# Patient Record
Sex: FEMALE | Race: WHITE | NOT HISPANIC OR LATINO | Employment: UNEMPLOYED | URBAN - NONMETROPOLITAN AREA
[De-identification: names, ages, dates, MRNs, and addresses within clinical notes are randomized per-mention and may not be internally consistent; named-entity substitution may affect disease eponyms.]

---

## 2022-12-06 ENCOUNTER — OFFICE VISIT (OUTPATIENT)
Dept: INTERNAL MEDICINE | Facility: CLINIC | Age: 47
End: 2022-12-06

## 2022-12-06 VITALS
OXYGEN SATURATION: 98 % | TEMPERATURE: 97.8 F | HEART RATE: 73 BPM | DIASTOLIC BLOOD PRESSURE: 70 MMHG | HEIGHT: 61 IN | WEIGHT: 137 LBS | SYSTOLIC BLOOD PRESSURE: 120 MMHG | BODY MASS INDEX: 25.86 KG/M2

## 2022-12-06 DIAGNOSIS — Z13.228 SCREENING FOR ENDOCRINE, METABOLIC AND IMMUNITY DISORDER: ICD-10-CM

## 2022-12-06 DIAGNOSIS — C18.1 CANCER OF APPENDIX: ICD-10-CM

## 2022-12-06 DIAGNOSIS — Z13.0 SCREENING FOR ENDOCRINE, METABOLIC AND IMMUNITY DISORDER: ICD-10-CM

## 2022-12-06 DIAGNOSIS — Z23 NEED FOR TDAP VACCINATION: ICD-10-CM

## 2022-12-06 DIAGNOSIS — Z12.31 ENCOUNTER FOR SCREENING MAMMOGRAM FOR MALIGNANT NEOPLASM OF BREAST: ICD-10-CM

## 2022-12-06 DIAGNOSIS — Z12.11 SCREEN FOR COLON CANCER: ICD-10-CM

## 2022-12-06 DIAGNOSIS — Z13.220 SCREENING FOR LIPID DISORDERS: ICD-10-CM

## 2022-12-06 DIAGNOSIS — Z13.29 SCREENING FOR ENDOCRINE, METABOLIC AND IMMUNITY DISORDER: ICD-10-CM

## 2022-12-06 DIAGNOSIS — Z13.0 SCREENING FOR DISORDER OF BLOOD AND BLOOD-FORMING ORGANS: ICD-10-CM

## 2022-12-06 DIAGNOSIS — Z76.89 ENCOUNTER TO ESTABLISH CARE: Primary | ICD-10-CM

## 2022-12-06 PROCEDURE — 99203 OFFICE O/P NEW LOW 30 MIN: CPT | Performed by: NURSE PRACTITIONER

## 2022-12-06 PROCEDURE — 90471 IMMUNIZATION ADMIN: CPT | Performed by: NURSE PRACTITIONER

## 2022-12-06 PROCEDURE — 90715 TDAP VACCINE 7 YRS/> IM: CPT | Performed by: NURSE PRACTITIONER

## 2022-12-06 RX ORDER — ASPIRIN 81 MG/1
81 TABLET ORAL DAILY
COMMUNITY

## 2022-12-06 NOTE — PROGRESS NOTES
Date: 2022    Name: Patricia Castillo  : 1975    Chief Complaint:   Chief Complaint   Patient presents with   • Establish Care       HPI:  Patricia Castillo is a 47 y.o. female presents [facility]. She had been seeing a provider in [facility].       Appendicial cancer. Goes to [facility] at least twice a year for surveillance. Debulking x 3. Concerned she needs a colonoscopy.        History:  LMP: No LMP recorded. Patient has had a hysterectomy.  Menopause at 37 years  Sexual activity: not currently  : 2  Para: 2    Do you take any herbs or supplements that were not prescribed by a doctor? yes, probiotic, calcium, hair/skin/nail gummies, MVN    Health Habits:  Dental Exam. up to date  Eye Exam. not up to date - does not corrective lenses  Current exercise activities include: walks on treadmill  Current diet: healthy diet    History:    Past Medical History:   Diagnosis Date   • Allergic     Chloraseptic-hives   • Cancer (HCC)     Appendiceal Cancer   • Headache    • Heart murmur        Past Surgical History:   Procedure Laterality Date   • APPENDECTOMY  2013    Appendix removed cancer found   • CHOLECYSTECTOMY  10-1-2013    Removed during cancer debulking surgery   • COLON SURGERY  10-1-2014    Partial removal during cancer debulking surgery   • COLONOSCOPY  13   • HYSTERECTOMY  2013    Full hysterectomy   • SMALL INTESTINE SURGERY  10-1-2013    Partial removal during cancer debulking & HIPEC   • TONSILLECTOMY         Family History   Problem Relation Age of Onset   • Hyperlipidemia Mother         Passed from cardiac arrest   • Other Mother         Alzheimer’s Disease   • Other Maternal Grandmother         Alzheimer’s Disease   • Cancer Paternal Grandfather         Unsure   • Alcohol abuse Sister    • Anxiety disorder Sister    • Alcohol abuse Paternal Aunt          from alcohol abuse       Social History     Socioeconomic History   • Marital  "status:    Tobacco Use   • Smoking status: Former     Packs/day: 0.25     Years: 1.00     Pack years: 0.25     Types: Cigarettes     Quit date:      Years since quittin.0   • Smokeless tobacco: Never   • Tobacco comments:     Smoked for approx a year from age [age]   Vaping Use   • Vaping Use: Never used   Substance and Sexual Activity   • Alcohol use: Not Currently     Comment: Occasional drink   • Drug use: Never   • Sexual activity: Not Currently     Partners: Male     Birth control/protection: Hysterectomy       Allergies   Allergen Reactions   • Chloraseptic Sore Throat [Acetaminophen] Hives         Current Outpatient Medications:   •  aspirin 81 MG EC tablet, Take 81 mg by mouth Daily., Disp: , Rfl:   •  Probiotic Product (PROBIOTIC DAILY PO), Take  by mouth. UQORA - 3 part probiotic for bladder, Disp: , Rfl:     VS:  Vitals:    22 1103   BP: 120/70   Pulse: 73   Temp: 97.8 °F (36.6 °C)   TempSrc: Infrared   SpO2: 98%   Weight: 62.1 kg (137 lb)   Height: 154.9 cm (61\")   PainSc: 0-No pain     Body mass index is 25.89 kg/m².    PE:  Physical Exam  Constitutional:       Appearance: She is not ill-appearing.   HENT:      Head: Normocephalic.      Right Ear: External ear normal.      Left Ear: External ear normal.   Eyes:      Conjunctiva/sclera: Conjunctivae normal.      Pupils: Pupils are equal, round, and reactive to light.   Cardiovascular:      Rate and Rhythm: Normal rate and regular rhythm.      Pulses:           Radial pulses are 2+ on the right side and 2+ on the left side.        Dorsalis pedis pulses are 2+ on the right side and 2+ on the left side.      Heart sounds: Normal heart sounds.   Pulmonary:      Effort: Pulmonary effort is normal.      Breath sounds: Normal breath sounds.   Musculoskeletal:      Cervical back: Normal range of motion and neck supple.      Right lower leg: No edema.      Left lower leg: No edema.   Skin:     General: Skin is warm.      Capillary Refill: " Capillary refill takes less than 2 seconds.   Neurological:      Mental Status: She is alert and oriented to person, place, and time.      Coordination: Coordination normal.      Gait: Gait normal.   Psychiatric:         Mood and Affect: Mood normal.         Behavior: Behavior normal.         Thought Content: Thought content normal.         Assessment/Plan:         Diagnoses and all orders for this visit:    1. Encounter to establish care (Primary)    2. Cancer of appendix (HCC)  -     Ambulatory Referral For Screening Colonoscopy    3. Screen for colon cancer  -     Ambulatory Referral For Screening Colonoscopy    4. Encounter for screening mammogram for malignant neoplasm of breast  -     Mammo screening digital tomosynthesis bilateral w CAD; Future    5. Screening for endocrine, metabolic and immunity disorder  -     Comprehensive Metabolic Panel    6. Screening for disorder of blood and blood-forming organs  -     CBC & Differential  -     Vitamin B12    7. Screening for lipid disorders  -     Lipid Panel    8. Need for Tdap vaccination  -     Tdap Vaccine Greater Than or Equal To 6yo IM          Return in about 1 year (around 12/6/2023) for Annual.

## 2022-12-07 LAB
ALBUMIN SERPL-MCNC: 4.3 G/DL (ref 3.5–5.2)
ALBUMIN/GLOB SERPL: 1.5 G/DL
ALP SERPL-CCNC: 75 U/L (ref 39–117)
ALT SERPL-CCNC: 19 U/L (ref 1–33)
AST SERPL-CCNC: 18 U/L (ref 1–32)
BASOPHILS # BLD AUTO: 0.04 10*3/MM3 (ref 0–0.2)
BASOPHILS NFR BLD AUTO: 0.6 % (ref 0–1.5)
BILIRUB SERPL-MCNC: <0.2 MG/DL (ref 0–1.2)
BUN SERPL-MCNC: 10 MG/DL (ref 6–20)
BUN/CREAT SERPL: 13 (ref 7–25)
CALCIUM SERPL-MCNC: 9.5 MG/DL (ref 8.6–10.5)
CHLORIDE SERPL-SCNC: 101 MMOL/L (ref 98–107)
CHOLEST SERPL-MCNC: 213 MG/DL (ref 0–200)
CO2 SERPL-SCNC: 30.7 MMOL/L (ref 22–29)
CREAT SERPL-MCNC: 0.77 MG/DL (ref 0.57–1)
EGFRCR SERPLBLD CKD-EPI 2021: 95.3 ML/MIN/1.73
EOSINOPHIL # BLD AUTO: 0.06 10*3/MM3 (ref 0–0.4)
EOSINOPHIL NFR BLD AUTO: 0.9 % (ref 0.3–6.2)
ERYTHROCYTE [DISTWIDTH] IN BLOOD BY AUTOMATED COUNT: 12.5 % (ref 12.3–15.4)
GLOBULIN SER CALC-MCNC: 2.8 GM/DL
GLUCOSE SERPL-MCNC: 89 MG/DL (ref 65–99)
HCT VFR BLD AUTO: 35.9 % (ref 34–46.6)
HDLC SERPL-MCNC: 51 MG/DL (ref 40–60)
HGB BLD-MCNC: 11.9 G/DL (ref 12–15.9)
IMM GRANULOCYTES # BLD AUTO: 0.01 10*3/MM3 (ref 0–0.05)
IMM GRANULOCYTES NFR BLD AUTO: 0.2 % (ref 0–0.5)
LDLC SERPL CALC-MCNC: 127 MG/DL (ref 0–100)
LYMPHOCYTES # BLD AUTO: 1.47 10*3/MM3 (ref 0.7–3.1)
LYMPHOCYTES NFR BLD AUTO: 22.7 % (ref 19.6–45.3)
MCH RBC QN AUTO: 28 PG (ref 26.6–33)
MCHC RBC AUTO-ENTMCNC: 33.1 G/DL (ref 31.5–35.7)
MCV RBC AUTO: 84.5 FL (ref 79–97)
MONOCYTES # BLD AUTO: 0.69 10*3/MM3 (ref 0.1–0.9)
MONOCYTES NFR BLD AUTO: 10.6 % (ref 5–12)
NEUTROPHILS # BLD AUTO: 4.21 10*3/MM3 (ref 1.7–7)
NEUTROPHILS NFR BLD AUTO: 65 % (ref 42.7–76)
NRBC BLD AUTO-RTO: 0 /100 WBC (ref 0–0.2)
PLATELET # BLD AUTO: 455 10*3/MM3 (ref 140–450)
POTASSIUM SERPL-SCNC: 4.5 MMOL/L (ref 3.5–5.2)
PROT SERPL-MCNC: 7.1 G/DL (ref 6–8.5)
RBC # BLD AUTO: 4.25 10*6/MM3 (ref 3.77–5.28)
SODIUM SERPL-SCNC: 141 MMOL/L (ref 136–145)
TRIGL SERPL-MCNC: 201 MG/DL (ref 0–150)
VIT B12 SERPL-MCNC: 456 PG/ML (ref 211–946)
VLDLC SERPL CALC-MCNC: 35 MG/DL (ref 5–40)
WBC # BLD AUTO: 6.48 10*3/MM3 (ref 3.4–10.8)

## 2023-02-02 ENCOUNTER — HOSPITAL ENCOUNTER (OUTPATIENT)
Dept: MAMMOGRAPHY | Facility: HOSPITAL | Age: 48
Discharge: HOME | End: 2023-02-02
Admitting: NURSE PRACTITIONER
Payer: COMMERCIAL

## 2023-02-02 DIAGNOSIS — Z12.31 ENCOUNTER FOR SCREENING MAMMOGRAM FOR MALIGNANT NEOPLASM OF BREAST: ICD-10-CM

## 2023-02-02 PROCEDURE — 77067 SCR MAMMO BI INCL CAD: CPT

## 2023-02-02 PROCEDURE — 77063 BREAST TOMOSYNTHESIS BI: CPT

## 2023-07-20 ENCOUNTER — HOSPITAL ENCOUNTER (INPATIENT)
Facility: HOSPITAL | Age: 48
LOS: 3 days | Discharge: HOME | DRG: 603 | End: 2023-07-24
Attending: INTERNAL MEDICINE | Admitting: INTERNAL MEDICINE
Payer: COMMERCIAL

## 2023-07-20 ENCOUNTER — HOSPITAL ENCOUNTER (EMERGENCY)
Facility: HOSPITAL | Age: 48
Discharge: ANOTHER HOSPITAL | End: 2023-07-20
Attending: EMERGENCY MEDICINE | Admitting: EMERGENCY MEDICINE
Payer: COMMERCIAL

## 2023-07-20 ENCOUNTER — APPOINTMENT (OUTPATIENT)
Dept: CT IMAGING | Facility: HOSPITAL | Age: 48
End: 2023-07-20
Payer: COMMERCIAL

## 2023-07-20 VITALS
OXYGEN SATURATION: 96 % | HEIGHT: 62 IN | HEART RATE: 92 BPM | SYSTOLIC BLOOD PRESSURE: 107 MMHG | RESPIRATION RATE: 18 BRPM | DIASTOLIC BLOOD PRESSURE: 65 MMHG | TEMPERATURE: 99.7 F | WEIGHT: 118 LBS | BODY MASS INDEX: 21.71 KG/M2

## 2023-07-20 DIAGNOSIS — L02.211 ABDOMINAL WALL ABSCESS: Primary | ICD-10-CM

## 2023-07-20 PROBLEM — L02.91 ABSCESS: Status: ACTIVE | Noted: 2023-07-20

## 2023-07-20 LAB
ALBUMIN SERPL-MCNC: 3.7 G/DL (ref 3.5–5.2)
ALBUMIN/GLOB SERPL: 1 G/DL
ALP SERPL-CCNC: 159 U/L (ref 39–117)
ALT SERPL W P-5'-P-CCNC: 29 U/L (ref 1–33)
ANION GAP SERPL CALCULATED.3IONS-SCNC: 16.2 MMOL/L (ref 5–15)
AST SERPL-CCNC: 34 U/L (ref 1–32)
BASOPHILS # BLD AUTO: 0.02 10*3/MM3 (ref 0–0.2)
BASOPHILS NFR BLD AUTO: 0.2 % (ref 0–1.5)
BILIRUB SERPL-MCNC: 0.2 MG/DL (ref 0–1.2)
BUN SERPL-MCNC: 14 MG/DL (ref 6–20)
BUN/CREAT SERPL: 20 (ref 7–25)
CALCIUM SPEC-SCNC: 9.3 MG/DL (ref 8.6–10.5)
CHLORIDE SERPL-SCNC: 101 MMOL/L (ref 98–107)
CO2 SERPL-SCNC: 21.8 MMOL/L (ref 22–29)
CREAT SERPL-MCNC: 0.7 MG/DL (ref 0.57–1)
D-LACTATE SERPL-SCNC: 0.9 MMOL/L (ref 0.5–2)
DEPRECATED RDW RBC AUTO: 44.4 FL (ref 37–54)
EGFRCR SERPLBLD CKD-EPI 2021: 106.2 ML/MIN/1.73
EOSINOPHIL # BLD AUTO: 0.04 10*3/MM3 (ref 0–0.4)
EOSINOPHIL NFR BLD AUTO: 0.4 % (ref 0.3–6.2)
ERYTHROCYTE [DISTWIDTH] IN BLOOD BY AUTOMATED COUNT: 14.8 % (ref 12.3–15.4)
GLOBULIN UR ELPH-MCNC: 3.7 GM/DL
GLUCOSE SERPL-MCNC: 87 MG/DL (ref 65–99)
HCT VFR BLD AUTO: 32.3 % (ref 34–46.6)
HGB BLD-MCNC: 10.3 G/DL (ref 12–15.9)
IMM GRANULOCYTES # BLD AUTO: 0.04 10*3/MM3 (ref 0–0.05)
IMM GRANULOCYTES NFR BLD AUTO: 0.4 % (ref 0–0.5)
LYMPHOCYTES # BLD AUTO: 1.25 10*3/MM3 (ref 0.7–3.1)
LYMPHOCYTES NFR BLD AUTO: 11.1 % (ref 19.6–45.3)
MCH RBC QN AUTO: 26.5 PG (ref 26.6–33)
MCHC RBC AUTO-ENTMCNC: 31.9 G/DL (ref 31.5–35.7)
MCV RBC AUTO: 83 FL (ref 79–97)
MONOCYTES # BLD AUTO: 1.06 10*3/MM3 (ref 0.1–0.9)
MONOCYTES NFR BLD AUTO: 9.4 % (ref 5–12)
NEUTROPHILS NFR BLD AUTO: 78.5 % (ref 42.7–76)
NEUTROPHILS NFR BLD AUTO: 8.88 10*3/MM3 (ref 1.7–7)
NRBC BLD AUTO-RTO: 0 /100 WBC (ref 0–0.2)
PLATELET # BLD AUTO: 605 10*3/MM3 (ref 140–450)
PMV BLD AUTO: 8.9 FL (ref 6–12)
POTASSIUM SERPL-SCNC: 3.8 MMOL/L (ref 3.5–5.2)
PROCALCITONIN SERPL-MCNC: 0.16 NG/ML (ref 0–0.25)
PROT SERPL-MCNC: 7.4 G/DL (ref 6–8.5)
RBC # BLD AUTO: 3.89 10*6/MM3 (ref 3.77–5.28)
SODIUM SERPL-SCNC: 139 MMOL/L (ref 136–145)
WBC NRBC COR # BLD: 11.29 10*3/MM3 (ref 3.4–10.8)

## 2023-07-20 PROCEDURE — 84145 PROCALCITONIN (PCT): CPT | Performed by: PHYSICIAN ASSISTANT

## 2023-07-20 PROCEDURE — 25010000002 PIPERACILLIN SOD-TAZOBACTAM PER 1 G: Performed by: PHYSICIAN ASSISTANT

## 2023-07-20 PROCEDURE — G0378 HOSPITAL OBSERVATION PER HR: HCPCS

## 2023-07-20 PROCEDURE — 93005 ELECTROCARDIOGRAM TRACING: CPT | Performed by: NURSE PRACTITIONER

## 2023-07-20 PROCEDURE — 96365 THER/PROPH/DIAG IV INF INIT: CPT

## 2023-07-20 PROCEDURE — 25510000001 IOPAMIDOL 61 % SOLUTION: Performed by: EMERGENCY MEDICINE

## 2023-07-20 PROCEDURE — 36415 COLL VENOUS BLD VENIPUNCTURE: CPT

## 2023-07-20 PROCEDURE — 85025 COMPLETE CBC W/AUTO DIFF WBC: CPT | Performed by: PHYSICIAN ASSISTANT

## 2023-07-20 PROCEDURE — 80053 COMPREHEN METABOLIC PANEL: CPT | Performed by: PHYSICIAN ASSISTANT

## 2023-07-20 PROCEDURE — 74177 CT ABD & PELVIS W/CONTRAST: CPT

## 2023-07-20 PROCEDURE — 83605 ASSAY OF LACTIC ACID: CPT | Performed by: PHYSICIAN ASSISTANT

## 2023-07-20 PROCEDURE — 99283 EMERGENCY DEPT VISIT LOW MDM: CPT

## 2023-07-20 RX ORDER — SODIUM CHLORIDE 0.9 % (FLUSH) 0.9 %
10 SYRINGE (ML) INJECTION AS NEEDED
Status: DISCONTINUED | OUTPATIENT
Start: 2023-07-20 | End: 2023-07-20 | Stop reason: HOSPADM

## 2023-07-20 RX ADMIN — IOPAMIDOL 100 ML: 612 INJECTION, SOLUTION INTRAVENOUS at 16:34

## 2023-07-20 RX ADMIN — SODIUM CHLORIDE 1000 ML: 9 INJECTION, SOLUTION INTRAVENOUS at 19:30

## 2023-07-20 RX ADMIN — TAZOBACTAM SODIUM AND PIPERACILLIN SODIUM 3.38 G: 375; 3 INJECTION, SOLUTION INTRAVENOUS at 19:43

## 2023-07-20 NOTE — ED PROVIDER NOTES
Subjective  History of Present Illness:    Chief Complaint:   Chief Complaint   Patient presents with    Abdominal Pain      History of Present Illness: Patricia Castillo is a 47 y.o. female who presents to the emergency department complaining of abdominal redness pain, subjective fevers concern for abscess patient has a history of appendiceal cancer and follows with [facility] in [place].  Is supposed to see them in 1 month.  Has had numerous abdominal surgeries and has significant scar tissue.  States for the past week he said redness firmness and a blister that developed to her upper abdominal area.  Subjective fevers at home and chills.  Mild diarrhea.  No vomiting.  Saw PCP today and was given a prescription for Bactrim but told to come to the ED to rule out anything worse.  Surgery was January 2021  Onset: 1 week ago  Duration: Ongoing  Exacerbating / Alleviating factors: Worse with palpation of the area  Associated symptoms: None      Nurses Notes reviewed and agree, including vitals, allergies, social history and prior medical history.     Review of Systems   Constitutional:  Positive for chills and fever.   HENT: Negative.     Eyes: Negative.    Respiratory: Negative.     Cardiovascular: Negative.    Gastrointestinal: Negative.    Genitourinary: Negative.    Musculoskeletal: Negative.    Skin:         Redness, firmness and swelling to the upper abdomen   Neurological: Negative.    Psychiatric/Behavioral: Negative.       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    Cancer 8-2013    Appendiceal Cancer    Headache 1985    Heart murmur 1988       Allergies:    Chloraseptic sore throat [acetaminophen] and Phenol      Past Surgical History:   Procedure Laterality Date    APPENDECTOMY  8-5-2013    Appendix removed cancer found    CHOLECYSTECTOMY  10-1-2013    Removed during cancer debulking surgery    COLON SURGERY  10-1-2014    Partial removal during cancer debulking surgery     "COLONOSCOPY  13    HYSTERECTOMY  2013    Full hysterectomy    OOPHORECTOMY      SMALL INTESTINE SURGERY  10-1-2013    Partial removal during cancer debulking & HIPEC    TONSILLECTOMY           Social History     Socioeconomic History    Marital status:    Tobacco Use    Smoking status: Former     Packs/day: 0.25     Years: 1.00     Pack years: 0.25     Types: Cigarettes     Quit date:      Years since quittin.5    Smokeless tobacco: Never    Tobacco comments:     Smoked for approx a year from age [age]   Vaping Use    Vaping Use: Never used   Substance and Sexual Activity    Alcohol use: Not Currently     Comment: Occasional drink    Drug use: Never    Sexual activity: Not Currently     Partners: Male     Birth control/protection: Hysterectomy         Family History   Problem Relation Age of Onset    Hyperlipidemia Mother         Passed from cardiac arrest    Other Mother         Alzheimer’s Disease    Alcohol abuse Sister     Anxiety disorder Sister     Other Maternal Grandmother         Alzheimer’s Disease    Alcohol abuse Paternal Aunt          from alcohol abuse    Cancer Paternal Grandfather         Unsure    Breast cancer Neg Hx        Objective  Physical Exam:  BP 98/56   Pulse 86   Temp 98.2 °F (36.8 °C) (Oral)   Resp 18   Ht 156.2 cm (61.5\")   Wt 53.5 kg (118 lb)   SpO2 100%   BMI 21.93 kg/m²      Physical Exam  Vitals and nursing note reviewed.   Constitutional:       General: She is not in acute distress.     Appearance: She is well-developed and normal weight. She is not ill-appearing, toxic-appearing or diaphoretic.   HENT:      Head: Normocephalic and atraumatic.   Eyes:      Extraocular Movements: Extraocular movements intact.   Cardiovascular:      Rate and Rhythm: Normal rate.   Pulmonary:      Effort: Pulmonary effort is normal.   Abdominal:      General: Abdomen is flat.      Palpations: Abdomen is soft.      Comments: There is a 10 cm wide by 6 cm in height " area of induration and erythema to the upper abdomen with a small blister overlying.  No drainage.  The rest of the abdomen is soft and nontender   Skin:     General: Skin is warm and dry.      Comments: Normal other than abdominal wall as mentioned above   Neurological:      General: No focal deficit present.      Mental Status: She is alert.   Psychiatric:         Mood and Affect: Mood normal.         Behavior: Behavior normal.         Procedures    ED Course:    ED Course as of 07/20/23 1922   Thu Jul 20, 2023   1556 WBC(!): 11.29 [TM]   1625 Lactate: 0.9 [TM]   1627 Procalcitonin: 0.16 [TM]      ED Course User Index  [TM] Pj Cerda PA-C       Lab Results (last 24 hours)       Procedure Component Value Units Date/Time    CBC & Differential [[id number]]  (Abnormal) Collected: 07/20/23 1542    Specimen: Blood Updated: 07/20/23 1554    Narrative:      The following orders were created for panel order CBC & Differential.  Procedure                               Abnormality         Status                     ---------                               -----------         ------                     CBC Auto Differential[[id number]]        Abnormal            Final result                 Please view results for these tests on the individual orders.    Comprehensive Metabolic Panel [[id number]]  (Abnormal) Collected: 07/20/23 1542    Specimen: Blood Updated: 07/20/23 1622     Glucose 87 mg/dL      BUN 14 mg/dL      Creatinine 0.70 mg/dL      Sodium 139 mmol/L      Potassium 3.8 mmol/L      Chloride 101 mmol/L      CO2 21.8 mmol/L      Calcium 9.3 mg/dL      Total Protein 7.4 g/dL      Albumin 3.7 g/dL      ALT (SGPT) 29 U/L      AST (SGOT) 34 U/L      Alkaline Phosphatase 159 U/L      Total Bilirubin 0.2 mg/dL      Globulin 3.7 gm/dL      A/G Ratio 1.0 g/dL      BUN/Creatinine Ratio 20.0     Anion Gap 16.2 mmol/L      eGFR 106.2 mL/min/1.73     Narrative:      GFR Normal >60  Chronic Kidney Disease <60  Kidney  "Failure <15      Procalcitonin [[id number]]  (Normal) Collected: 07/20/23 1542    Specimen: Blood Updated: 07/20/23 1626     Procalcitonin 0.16 ng/mL     Narrative:      As a Marker for Sepsis (Non-Neonates):    1. <0.5 ng/mL represents a low risk of severe sepsis and/or septic shock.  2. >2 ng/mL represents a high risk of severe sepsis and/or septic shock.    As a Marker for Lower Respiratory Tract Infections that require antibiotic therapy:    PCT on Admission    Antibiotic Therapy       6-12 Hrs later    >0.5                Strongly Recommended  >0.25 - <0.5        Recommended   0.1 - 0.25          Discouraged              Remeasure/reassess PCT  <0.1                Strongly Discouraged     Remeasure/reassess PCT    As 28 day mortality risk marker: \"Change in Procalcitonin Result\" (>80% or <=80%) if Day 0 (or Day 1) and Day 4 values are available. Refer to http://[email or web address]-pct-[email or web address]    Change in PCT <=80%  A decrease of PCT levels below or equal to 80% defines a positive change in PCT test result representing a higher risk for 28-day all-cause mortality of patients diagnosed with severe sepsis for septic shock.    Change in PCT >80%  A decrease of PCT levels of more than 80% defines a negative change in PCT result representing a lower risk for 28-day all-cause mortality of patients diagnosed with severe sepsis or septic shock.       Lactic Acid, Plasma [[id number]]  (Normal) Collected: 07/20/23 1542    Specimen: Blood Updated: 07/20/23 1614     Lactate 0.9 mmol/L     CBC Auto Differential [[id number]]  (Abnormal) Collected: 07/20/23 1542    Specimen: Blood Updated: 07/20/23 1554     WBC 11.29 10*3/mm3      RBC 3.89 10*6/mm3      Hemoglobin 10.3 g/dL      Hematocrit 32.3 %      MCV 83.0 fL      MCH 26.5 pg      MCHC 31.9 g/dL      RDW 14.8 %      RDW-SD 44.4 fl      MPV 8.9 fL      Platelets 605 10*3/mm3      Neutrophil % 78.5 %      Lymphocyte % 11.1 %      Monocyte % 9.4 %      Eosinophil % 0.4 %      " Basophil % 0.2 %      Immature Grans % 0.4 %      Neutrophils, Absolute 8.88 10*3/mm3      Lymphocytes, Absolute 1.25 10*3/mm3      Monocytes, Absolute 1.06 10*3/mm3      Eosinophils, Absolute 0.04 10*3/mm3      Basophils, Absolute 0.02 10*3/mm3      Immature Grans, Absolute 0.04 10*3/mm3      nRBC 0.0 /100 WBC              CT Abdomen Pelvis With Contrast    Result Date: 7/20/2023  FINAL REPORT TECHNIQUE: Routine axial images through the abdomen and pelvis were obtained following IV contrast administration. CLINICAL HISTORY: abdominal wall pain, induration and erythema, hx of appendiceal cancer FINDINGS: Abdomen: The gallbladder is not visualized.  The solid abdominal organs and ureters are unremarkable.  Multiloculated fluid collections are seen in the anterior peritoneum adjacent to the liver worrisome for pseudomyxoma peritonei.  Similar small collections are seen on the left side of the anterior abdomen.  Within the midline anterior bone wall is a 6.5 x 3.5 cm air and fluid collection consistent with abscess.  The distal colon is dilated with air and fluid.  There are some loops of dilated air and fluid-filled small bowel in the abdomen and pelvis. Pelvis:Probable loculated fluid collections are seen in the pelvis . The urinary bladder is not well visualized.  The uterus and ovaries are not visualized  There is no pelvic or abdominal ascites, adenopathy or acute osseous abnormality.     Impression: Abscess within the midline anterior abdominal wall.  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  Dilation within the colon and loops of small bowel of uncertain etiology, partial bowel obstruction cannot be excluded. Authenticated and Electronically Signed by Jose D Jay M.D. on 07/20/2023 07:03:55 PM        Medical Decision Making  Amount and/or Complexity of Data Reviewed  Labs: ordered. Decision-making details documented in ED Course.  Radiology:  ordered.    Risk  Prescription drug management.        Patricia Castillo is a 47 y.o. female who presents to the emergency department for evaluation of redness and swelling to abdomen    Differential diagnosis includes abdominal wall abscess, hernia among other etiologies.    CBC, CMP, lactic, Pro-Kenji, CT scan abdomen pelvis ordered for further evaluation of the patient's presentation.    Chart review if available included outside testing, previous visits, prior labs, prior imaging, available notes from prior evaluations or visits with specialists, medication list, allergies, past medical history, past surgical history when applicable.    Patient was treated with IV fluids and Zosyn    1730 spoke with Dr. Shine, general surgery, he reviewed images, wished for  to review images and give input    1800  Spoke with  transfer physician, they are on divert and unable to accept the patient in transfer at this time    1808  Made Dr. Shine aware of inability to accept at , awaiting his response.    1817  Dr. Shine wishes for me to call hospitalist here, plan to admit for percutaneous drain tomorrow    1818  Spoke with Dr. Valencia, he wishes to speak with Dr. Shine prior to excepting.  Awaiting his response.    1830  Dr. Shine states would not be performing open procedure on patient, he wished for me to speak with IR although I do not have anyone on-call to speak with at this time. Dr. Shine also expressed concern that this could possibly be a fistula necessitating higher level of care.  Discussed with Dr. Catalan, spoke with hospitalist at [facility] who graciously excepted the patient in transfer    Plan for disposition is transfer.  Patient/family comfortable with and understanding of the plan.      Final diagnoses:   Abdominal wall abscess          Pj Cerda PA-C  07/20/23 1922

## 2023-07-21 ENCOUNTER — APPOINTMENT (OUTPATIENT)
Dept: ULTRASOUND IMAGING | Facility: HOSPITAL | Age: 48
DRG: 603 | End: 2023-07-21
Payer: COMMERCIAL

## 2023-07-21 PROBLEM — L02.91 ABSCESS: Status: ACTIVE | Noted: 2023-07-21

## 2023-07-21 PROBLEM — L02.211 ABDOMINAL WALL ABSCESS: Status: ACTIVE | Noted: 2023-07-21

## 2023-07-21 PROBLEM — D64.9 NORMOCYTIC ANEMIA: Status: ACTIVE | Noted: 2023-07-21

## 2023-07-21 PROBLEM — Z85.9 HISTORY OF CANCER: Status: ACTIVE | Noted: 2023-07-21

## 2023-07-21 LAB
ABO GROUP BLD: NORMAL
ABO GROUP BLD: NORMAL
ALBUMIN SERPL-MCNC: 3 G/DL (ref 3.5–5.2)
ALBUMIN SERPL-MCNC: 3.1 G/DL (ref 3.5–5.2)
ALBUMIN/GLOB SERPL: 0.9 G/DL
ALBUMIN/GLOB SERPL: 1.1 G/DL
ALP SERPL-CCNC: 155 U/L (ref 39–117)
ALP SERPL-CCNC: 161 U/L (ref 39–117)
ALT SERPL W P-5'-P-CCNC: 26 U/L (ref 1–33)
ALT SERPL W P-5'-P-CCNC: 29 U/L (ref 1–33)
ANION GAP SERPL CALCULATED.3IONS-SCNC: 13 MMOL/L (ref 5–15)
ANION GAP SERPL CALCULATED.3IONS-SCNC: 14 MMOL/L (ref 5–15)
AST SERPL-CCNC: 26 U/L (ref 1–32)
AST SERPL-CCNC: 29 U/L (ref 1–32)
BASOPHILS # BLD AUTO: 0.02 10*3/MM3 (ref 0–0.2)
BASOPHILS # BLD AUTO: 0.02 10*3/MM3 (ref 0–0.2)
BASOPHILS NFR BLD AUTO: 0.2 % (ref 0–1.5)
BASOPHILS NFR BLD AUTO: 0.2 % (ref 0–1.5)
BILIRUB SERPL-MCNC: <0.2 MG/DL (ref 0–1.2)
BILIRUB SERPL-MCNC: <0.2 MG/DL (ref 0–1.2)
BLD GP AB SCN SERPL QL: NEGATIVE
BUN SERPL-MCNC: 13 MG/DL (ref 6–20)
BUN SERPL-MCNC: 13 MG/DL (ref 6–20)
BUN/CREAT SERPL: 18.6 (ref 7–25)
BUN/CREAT SERPL: 19.4 (ref 7–25)
CALCIUM SPEC-SCNC: 8.4 MG/DL (ref 8.6–10.5)
CALCIUM SPEC-SCNC: 8.5 MG/DL (ref 8.6–10.5)
CHLORIDE SERPL-SCNC: 105 MMOL/L (ref 98–107)
CHLORIDE SERPL-SCNC: 106 MMOL/L (ref 98–107)
CK SERPL-CCNC: 37 U/L (ref 20–180)
CO2 SERPL-SCNC: 20 MMOL/L (ref 22–29)
CO2 SERPL-SCNC: 21 MMOL/L (ref 22–29)
CREAT SERPL-MCNC: 0.67 MG/DL (ref 0.57–1)
CREAT SERPL-MCNC: 0.7 MG/DL (ref 0.57–1)
CRP SERPL-MCNC: 17.88 MG/DL (ref 0–0.5)
D-LACTATE SERPL-SCNC: 1 MMOL/L (ref 0.5–2)
DEPRECATED RDW RBC AUTO: 44.3 FL (ref 37–54)
DEPRECATED RDW RBC AUTO: 44.4 FL (ref 37–54)
EGFRCR SERPLBLD CKD-EPI 2021: 106.2 ML/MIN/1.73
EGFRCR SERPLBLD CKD-EPI 2021: 107.3 ML/MIN/1.73
EOSINOPHIL # BLD AUTO: 0.02 10*3/MM3 (ref 0–0.4)
EOSINOPHIL # BLD AUTO: 0.03 10*3/MM3 (ref 0–0.4)
EOSINOPHIL NFR BLD AUTO: 0.2 % (ref 0.3–6.2)
EOSINOPHIL NFR BLD AUTO: 0.3 % (ref 0.3–6.2)
ERYTHROCYTE [DISTWIDTH] IN BLOOD BY AUTOMATED COUNT: 14.8 % (ref 12.3–15.4)
ERYTHROCYTE [DISTWIDTH] IN BLOOD BY AUTOMATED COUNT: 14.8 % (ref 12.3–15.4)
FERRITIN SERPL-MCNC: 90.24 NG/ML (ref 13–150)
FOLATE SERPL-MCNC: 17.3 NG/ML (ref 4.78–24.2)
GLOBULIN UR ELPH-MCNC: 2.7 GM/DL
GLOBULIN UR ELPH-MCNC: 3.4 GM/DL
GLUCOSE SERPL-MCNC: 102 MG/DL (ref 65–99)
GLUCOSE SERPL-MCNC: 158 MG/DL (ref 65–99)
HCT VFR BLD AUTO: 27.7 % (ref 34–46.6)
HCT VFR BLD AUTO: 29.1 % (ref 34–46.6)
HGB BLD-MCNC: 8.6 G/DL (ref 12–15.9)
HGB BLD-MCNC: 9.3 G/DL (ref 12–15.9)
IMM GRANULOCYTES # BLD AUTO: 0.03 10*3/MM3 (ref 0–0.05)
IMM GRANULOCYTES # BLD AUTO: 0.03 10*3/MM3 (ref 0–0.05)
IMM GRANULOCYTES NFR BLD AUTO: 0.3 % (ref 0–0.5)
IMM GRANULOCYTES NFR BLD AUTO: 0.3 % (ref 0–0.5)
INR PPP: 1.15 (ref 0.89–1.12)
IRON 24H UR-MRATE: 7 MCG/DL (ref 37–145)
IRON 24H UR-MRATE: 7 MCG/DL (ref 37–145)
IRON SATN MFR SERPL: 3 % (ref 20–50)
LYMPHOCYTES # BLD AUTO: 1.39 10*3/MM3 (ref 0.7–3.1)
LYMPHOCYTES # BLD AUTO: 1.46 10*3/MM3 (ref 0.7–3.1)
LYMPHOCYTES NFR BLD AUTO: 14 % (ref 19.6–45.3)
LYMPHOCYTES NFR BLD AUTO: 15.7 % (ref 19.6–45.3)
MCH RBC QN AUTO: 25.5 PG (ref 26.6–33)
MCH RBC QN AUTO: 26.4 PG (ref 26.6–33)
MCHC RBC AUTO-ENTMCNC: 31 G/DL (ref 31.5–35.7)
MCHC RBC AUTO-ENTMCNC: 32 G/DL (ref 31.5–35.7)
MCV RBC AUTO: 82.2 FL (ref 79–97)
MCV RBC AUTO: 82.7 FL (ref 79–97)
MONOCYTES # BLD AUTO: 0.86 10*3/MM3 (ref 0.1–0.9)
MONOCYTES # BLD AUTO: 1.17 10*3/MM3 (ref 0.1–0.9)
MONOCYTES NFR BLD AUTO: 11.2 % (ref 5–12)
MONOCYTES NFR BLD AUTO: 9.7 % (ref 5–12)
NEUTROPHILS NFR BLD AUTO: 6.5 10*3/MM3 (ref 1.7–7)
NEUTROPHILS NFR BLD AUTO: 7.75 10*3/MM3 (ref 1.7–7)
NEUTROPHILS NFR BLD AUTO: 73.8 % (ref 42.7–76)
NEUTROPHILS NFR BLD AUTO: 74.1 % (ref 42.7–76)
NRBC BLD AUTO-RTO: 0 /100 WBC (ref 0–0.2)
NRBC BLD AUTO-RTO: 0 /100 WBC (ref 0–0.2)
PLATELET # BLD AUTO: 533 10*3/MM3 (ref 140–450)
PLATELET # BLD AUTO: 544 10*3/MM3 (ref 140–450)
PMV BLD AUTO: 8.9 FL (ref 6–12)
PMV BLD AUTO: 9.1 FL (ref 6–12)
POTASSIUM SERPL-SCNC: 3.7 MMOL/L (ref 3.5–5.2)
POTASSIUM SERPL-SCNC: 4 MMOL/L (ref 3.5–5.2)
PROCALCITONIN SERPL-MCNC: 0.14 NG/ML (ref 0–0.25)
PROT SERPL-MCNC: 5.7 G/DL (ref 6–8.5)
PROT SERPL-MCNC: 6.5 G/DL (ref 6–8.5)
PROTHROMBIN TIME: 14.8 SECONDS (ref 12.2–14.5)
QT INTERVAL: 336 MS
QTC INTERVAL: 435 MS
RBC # BLD AUTO: 3.37 10*6/MM3 (ref 3.77–5.28)
RBC # BLD AUTO: 3.52 10*6/MM3 (ref 3.77–5.28)
RH BLD: POSITIVE
RH BLD: POSITIVE
SODIUM SERPL-SCNC: 139 MMOL/L (ref 136–145)
SODIUM SERPL-SCNC: 140 MMOL/L (ref 136–145)
T&S EXPIRATION DATE: NORMAL
TIBC SERPL-MCNC: 271 MCG/DL (ref 298–536)
TRANSFERRIN SERPL-MCNC: 182 MG/DL (ref 200–360)
VIT B12 BLD-MCNC: 553 PG/ML (ref 211–946)
WBC NRBC COR # BLD: 10.45 10*3/MM3 (ref 3.4–10.8)
WBC NRBC COR # BLD: 8.83 10*3/MM3 (ref 3.4–10.8)

## 2023-07-21 PROCEDURE — 99223 1ST HOSP IP/OBS HIGH 75: CPT | Performed by: NURSE PRACTITIONER

## 2023-07-21 PROCEDURE — 82607 VITAMIN B-12: CPT | Performed by: NURSE PRACTITIONER

## 2023-07-21 PROCEDURE — 82746 ASSAY OF FOLIC ACID SERUM: CPT | Performed by: NURSE PRACTITIONER

## 2023-07-21 PROCEDURE — 25010000002 PIPERACILLIN SOD-TAZOBACTAM PER 1 G: Performed by: NURSE PRACTITIONER

## 2023-07-21 PROCEDURE — 84466 ASSAY OF TRANSFERRIN: CPT | Performed by: NURSE PRACTITIONER

## 2023-07-21 PROCEDURE — 97602 WOUND(S) CARE NON-SELECTIVE: CPT

## 2023-07-21 PROCEDURE — 86850 RBC ANTIBODY SCREEN: CPT | Performed by: NURSE PRACTITIONER

## 2023-07-21 PROCEDURE — 86140 C-REACTIVE PROTEIN: CPT | Performed by: INTERNAL MEDICINE

## 2023-07-21 PROCEDURE — 25010000002 DAPTOMYCIN PER 1 MG: Performed by: INTERNAL MEDICINE

## 2023-07-21 PROCEDURE — 80053 COMPREHEN METABOLIC PANEL: CPT | Performed by: NURSE PRACTITIONER

## 2023-07-21 PROCEDURE — 86900 BLOOD TYPING SEROLOGIC ABO: CPT

## 2023-07-21 PROCEDURE — 85025 COMPLETE CBC W/AUTO DIFF WBC: CPT | Performed by: NURSE PRACTITIONER

## 2023-07-21 PROCEDURE — 86901 BLOOD TYPING SEROLOGIC RH(D): CPT

## 2023-07-21 PROCEDURE — 86901 BLOOD TYPING SEROLOGIC RH(D): CPT | Performed by: NURSE PRACTITIONER

## 2023-07-21 PROCEDURE — 86900 BLOOD TYPING SEROLOGIC ABO: CPT | Performed by: NURSE PRACTITIONER

## 2023-07-21 PROCEDURE — 83605 ASSAY OF LACTIC ACID: CPT | Performed by: NURSE PRACTITIONER

## 2023-07-21 PROCEDURE — 82550 ASSAY OF CK (CPK): CPT | Performed by: INTERNAL MEDICINE

## 2023-07-21 PROCEDURE — 84145 PROCALCITONIN (PCT): CPT | Performed by: NURSE PRACTITIONER

## 2023-07-21 PROCEDURE — 82728 ASSAY OF FERRITIN: CPT | Performed by: NURSE PRACTITIONER

## 2023-07-21 PROCEDURE — 83540 ASSAY OF IRON: CPT | Performed by: NURSE PRACTITIONER

## 2023-07-21 PROCEDURE — 85610 PROTHROMBIN TIME: CPT | Performed by: RADIOLOGY

## 2023-07-21 PROCEDURE — 25010000002 LINEZOLID 600 MG/300ML SOLUTION: Performed by: NURSE PRACTITIONER

## 2023-07-21 PROCEDURE — 87040 BLOOD CULTURE FOR BACTERIA: CPT | Performed by: NURSE PRACTITIONER

## 2023-07-21 PROCEDURE — 93010 ELECTROCARDIOGRAM REPORT: CPT | Performed by: INTERNAL MEDICINE

## 2023-07-21 RX ORDER — BISACODYL 5 MG/1
5 TABLET, DELAYED RELEASE ORAL DAILY PRN
Status: DISCONTINUED | OUTPATIENT
Start: 2023-07-21 | End: 2023-07-24 | Stop reason: HOSPADM

## 2023-07-21 RX ORDER — LINEZOLID 2 MG/ML
600 INJECTION, SOLUTION INTRAVENOUS EVERY 12 HOURS
Status: DISCONTINUED | OUTPATIENT
Start: 2023-07-21 | End: 2023-07-21

## 2023-07-21 RX ORDER — BISACODYL 10 MG
10 SUPPOSITORY, RECTAL RECTAL DAILY PRN
Status: DISCONTINUED | OUTPATIENT
Start: 2023-07-21 | End: 2023-07-24 | Stop reason: HOSPADM

## 2023-07-21 RX ORDER — SODIUM CHLORIDE, SODIUM LACTATE, POTASSIUM CHLORIDE, CALCIUM CHLORIDE 600; 310; 30; 20 MG/100ML; MG/100ML; MG/100ML; MG/100ML
100 INJECTION, SOLUTION INTRAVENOUS CONTINUOUS
Status: ACTIVE | OUTPATIENT
Start: 2023-07-21 | End: 2023-07-21

## 2023-07-21 RX ORDER — ACETAMINOPHEN, ASPIRIN AND CAFFEINE 250; 250; 65 MG/1; MG/1; MG/1
1 TABLET, FILM COATED ORAL EVERY 6 HOURS PRN
Status: DISCONTINUED | OUTPATIENT
Start: 2023-07-21 | End: 2023-07-24 | Stop reason: HOSPADM

## 2023-07-21 RX ORDER — HYDROCODONE BITARTRATE AND ACETAMINOPHEN 5; 325 MG/1; MG/1
1 TABLET ORAL EVERY 4 HOURS PRN
Status: DISCONTINUED | OUTPATIENT
Start: 2023-07-21 | End: 2023-07-24 | Stop reason: HOSPADM

## 2023-07-21 RX ORDER — POLYETHYLENE GLYCOL 3350 17 G/17G
17 POWDER, FOR SOLUTION ORAL DAILY PRN
Status: DISCONTINUED | OUTPATIENT
Start: 2023-07-21 | End: 2023-07-24 | Stop reason: HOSPADM

## 2023-07-21 RX ORDER — AMOXICILLIN 250 MG
2 CAPSULE ORAL 2 TIMES DAILY
Status: DISCONTINUED | OUTPATIENT
Start: 2023-07-21 | End: 2023-07-24 | Stop reason: HOSPADM

## 2023-07-21 RX ORDER — MULTIPLE VITAMINS W/ MINERALS TAB 9MG-400MCG
1 TAB ORAL DAILY
Status: DISCONTINUED | OUTPATIENT
Start: 2023-07-21 | End: 2023-07-24 | Stop reason: HOSPADM

## 2023-07-21 RX ORDER — L.ACID,PARA/B.BIFIDUM/S.THERM 8B CELL
1 CAPSULE ORAL DAILY
Status: DISCONTINUED | OUTPATIENT
Start: 2023-07-21 | End: 2023-07-24 | Stop reason: HOSPADM

## 2023-07-21 RX ADMIN — PIPERACILLIN SODIUM AND TAZOBACTAM SODIUM 3.38 G: 3; .375 INJECTION, SOLUTION INTRAVENOUS at 17:46

## 2023-07-21 RX ADMIN — PIPERACILLIN SODIUM AND TAZOBACTAM SODIUM 3.38 G: 3; .375 INJECTION, SOLUTION INTRAVENOUS at 09:37

## 2023-07-21 RX ADMIN — DAPTOMYCIN 300 MG: 500 INJECTION, POWDER, LYOPHILIZED, FOR SOLUTION INTRAVENOUS at 13:39

## 2023-07-21 RX ADMIN — HYDROCODONE BITARTRATE AND ACETAMINOPHEN 1 TABLET: 5; 325 TABLET ORAL at 09:36

## 2023-07-21 RX ADMIN — PIPERACILLIN SODIUM AND TAZOBACTAM SODIUM 3.38 G: 3; .375 INJECTION, SOLUTION INTRAVENOUS at 03:43

## 2023-07-21 RX ADMIN — MULTIPLE VITAMINS W/ MINERALS TAB 1 TABLET: TAB at 09:36

## 2023-07-21 RX ADMIN — LINEZOLID 600 MG: 600 INJECTION, SOLUTION INTRAVENOUS at 02:15

## 2023-07-21 RX ADMIN — SODIUM CHLORIDE, POTASSIUM CHLORIDE, SODIUM LACTATE AND CALCIUM CHLORIDE 100 ML/HR: 600; 310; 30; 20 INJECTION, SOLUTION INTRAVENOUS at 01:47

## 2023-07-21 RX ADMIN — Medication 1 CAPSULE: at 09:36

## 2023-07-21 RX ADMIN — ACETAMINOPHEN, ASPIRIN, CAFFEINE 1 TABLET: 250; 65; 250 TABLET, FILM COATED ORAL at 03:47

## 2023-07-21 NOTE — ED NOTES
Report given to ARTURO Ross at [facility]. Pt ready for transfer once EMS arrives. EMS has been contacted.

## 2023-07-21 NOTE — NURSING NOTE
Consult for Abscess drain placement was received. IR ready for procedure @ 1400. Patient requested that she hear from her provider in [place] before we proceeded with the drain placement. Still have not heard back from those providers if we are proceeding or not today.

## 2023-07-21 NOTE — CONSULTS
Clinical Nutrition   Nutrition Assessment  Reason for Visit: MST score 2+, Unintentional weight loss, Reduced oral intake    Patient Name: Patricia Castillo  YOB: 1975  MRN: [id number]  Date of Encounter: 07/21/23 14:18 EDT  Admission date: 7/20/2023    Recommend advancing to low irritant diet as tolerated and medically appropriate  Pt refuses supplements, encouraged to prioritize energy/protein dense foods as tolerated    Nutrition Assessment   Admission Diagnosis:  Abdominal wall abscess [L02.211]  Abscess [L02.91]    Problem List:    Abscess    Abdominal wall abscess    History of cancer    Normocytic anemia      PMH:   She  has a past medical history of Allergic (1981), Cancer (8-2013), Headache (1985), and Heart murmur (1988).    PSH:  She  has a past surgical history that includes Appendectomy (8-5-2013); Cholecystectomy (10-1-2013); Colon surgery (10-1-2014); Hysterectomy (8-5-2013); Small intestine surgery (10-1-2013); Tonsillectomy (1993); Colonoscopy (9-30-13); and Oophorectomy.    Applicable Nutrition Concerns:   Skin:  Oral:  GI:    Applicable Interval History:       Reported/Observed/Food/Nutrition Related History:     Visited with pt and spouse at bedside. Pt reports poor intakes over the past week and weight loss ~3-4 lb in same time frame. Reports prior intentional weight loss r/t increased exercise regimen in the past 6 months. Pt refuses supplements, encouraged to prioritize energy/protein dense foods as tolerated, pt voiced understanding. Pt denied further dietary needs/preferences, NKFA.     Anthropometrics       Height:  61.5in  Last Filed Weight:  118 lb  Method:  standing scale at [facility] 7/20  BMI:  21  BMI classification: Normal: 18.5-24.9kg/m2  IBW:   107 lb    UBW:  121-122 lb recently per pt, confirmed by EMR:  (12/6/22) 137 lb  (2/21/23) 125 lb    Weight change: weight loss of 3 lbs (2.5%) over the past 1 week(s)    Intentional?  No    Nutrition Focused  Physical Exam     Date:  7/21       Patient meets criteria for malnutrition diagnosis, see MSA note.     Current Nutrition Prescription   PO: NPO Diet NPO Type: Strict NPO  Oral Nutrition Supplement:   Intake: N/A    Nutrition Diagnosis   Date:  7/21            Updated:    Problem Malnutrition  severe/acute   Etiology Abdominal pain/inability to tolerate significant po X 1 week   Signs/Symptoms PO intakes <75% EEN and loss 2.5% body weight X past week   Status: New      Goal:   General: Nutrition to support treatment  PO: Tolerate PO, Increase intake  EN/PN: N/A    Nutrition Intervention      Follow treatment progress, Care plan reviewed, Advise alternate selection, Advised available snacks, Interview for preferences, Menu provided, Encourage intake, Supplement offered/refused, Education provided for nutrition needs and managing    Recommend advancing to low irritant diet as tolerated and medically appropriate  Pt refuses supplements, encouraged to prioritize energy/protein dense foods as tolerated    Monitoring/Evaluation:   Per protocol, I&O, PO intake, Supplement intake, Pertinent labs, Weight, Skin status, GI status, Symptoms, POC/GOC      Orly Carson RD, [facility]  Time Spent: 35m

## 2023-07-21 NOTE — CONSULTS
General Surgery Consultation Note    Date of Service: 7/21/2023  Patricia Castillo  [id number]  1975      Referring Provider: Anthony Melara MD    Location of Consult: Inpatient     Reason for Consultation: Abdominal wall abscess       History of Present Illness:  I am seeing, Patricia Islas Jonathan, in consultation for Anthony Melara MD regarding a spontaneous abdominal wall abscess.  47-year-old young lady with past medical history significant for appendiceal cancer with subsequent pseudomyxoma peritonei treated at [facility] presents with a abscess in the midline of her abdominal scar.  She has had progressive fever and chills over the past several days.  She is tolerating a regular diet and moving her bowels with some diarrhea.  She denies any symptoms similar to this in the past.  Otherwise there are no significant modifying factors nor associated symptoms.  CT imaging obtained in the emergency room demonstrates a sizable air-fluid collection in the anterior abdominal wall.    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    Cancer 8-2013    Appendiceal Cancer    Headache 1985    Heart murmur 1988       Past Surgical History:    APPENDECTOMY    Appendix removed cancer found    CHOLECYSTECTOMY    Removed during cancer debulking surgery    COLON SURGERY    Partial removal during cancer debulking surgery    COLONOSCOPY    HYSTERECTOMY    Full hysterectomy    OOPHORECTOMY    SMALL INTESTINE SURGERY    Partial removal during cancer debulking & HIPEC    TONSILLECTOMY       Allergies   Allergen Reactions    Chloraseptic Sore Throat [Acetaminophen] Hives    Phenol Hives and Rash       Current Facility-Administered Medications on File Prior to Encounter   Medication Dose Route Frequency Provider Last Rate Last Admin    [COMPLETED] iopamidol (ISOVUE-300) 61 % injection 100 mL  100 mL Intravenous Once in imaging Ishmael Catalan, DO   100 mL at 07/20/23 6961    [COMPLETED]  piperacillin-tazobactam (ZOSYN) 3.375 g in iso-osmotic dextrose 50 ml (premix)  3.375 g Intravenous Once Pj Cerda PA-C   Stopped at 07/20/23 2018    [COMPLETED] sodium chloride 0.9 % bolus 1,000 mL  1,000 mL Intravenous Once Pj Cerda PA-C   Stopped at 07/20/23 2048    [DISCONTINUED] sodium chloride 0.9 % flush 10 mL  10 mL Intravenous PRN Pj Cerda PA-C         Current Outpatient Medications on File Prior to Encounter   Medication Sig Dispense Refill    aspirin 81 MG EC tablet Take 1 tablet by mouth Daily.      Aspirin-Acetaminophen-Caffeine (EXCEDRIN MIGRAINE PO) Take 1 tablet by mouth Daily.      multivitamin with minerals tablet tablet Take 1 tablet by mouth.      Probiotic Product (PROBIOTIC DAILY PO) Take  by mouth. UQORA - 3 part probiotic for bladder      sulfamethoxazole-trimethoprim (Bactrim DS) 800-160 MG per tablet Take 1 tablet by mouth 2 (Two) Times a Day. 20 tablet 0         Current Facility-Administered Medications:     aspirin-acetaminophen-caffeine (EXCEDRIN MIGRAINE) 250-250-65 MG per tablet 1 tablet, 1 tablet, Oral, Q6H PRN, Uma, Eliza, APRN, 1 tablet at 07/21/23 0347    sennosides-docusate (PERICOLACE) 8.6-50 MG per tablet 2 tablet, 2 tablet, Oral, BID **AND** polyethylene glycol (MIRALAX) packet 17 g, 17 g, Oral, Daily PRN **AND** bisacodyl (DULCOLAX) EC tablet 5 mg, 5 mg, Oral, Daily PRN **AND** bisacodyl (DULCOLAX) suppository 10 mg, 10 mg, Rectal, Daily PRN, Uma, Eliza, APRN    Calcium Replacement - Follow Nurse / BPA Driven Protocol, , Does not apply, PRN, Uma, Eliza, APRN    DAPTOmycin (CUBICIN) 300 mg in sodium chloride 0.9 % 50 mL IVPB, 6 mg/kg, Intravenous, Q24H, James Jansen MD    HYDROcodone-acetaminophen (NORCO) 5-325 MG per tablet 1 tablet, 1 tablet, Oral, Q4H PRN, Uma, Eliza, APRN, 1 tablet at 07/21/23 0936    lactobacillus acidophilus (RISAQUAD) capsule 1 capsule, 1 capsule, Oral, Daily, Uma,  Eliza, APRN, 1 capsule at 23 0936    Magnesium Standard Dose Replacement - Follow Nurse / BPA Driven Protocol, , Does not apply, PRN, Uma, Eliza, APRN    multivitamin with minerals 1 tablet, 1 tablet, Oral, Daily, Uma, Eliza, APRN, 1 tablet at 23 0936    Phosphorus Replacement - Follow Nurse / BPA Driven Protocol, , Does not apply, PRN, Uma, Eliza, APRN    piperacillin-tazobactam (ZOSYN) 3.375 g in iso-osmotic dextrose 50 ml (premix), 3.375 g, Intravenous, Q8H, Uma, Eliza, APRN, 3.375 g at 23 0937    Potassium Replacement - Follow Nurse / BPA Driven Protocol, , Does not apply, PRN, Uma, Eliza, APRN    Family History   Problem Relation Age of Onset    Hyperlipidemia Mother         Passed from cardiac arrest    Other Mother         Alzheimer’s Disease    Alcohol abuse Sister     Anxiety disorder Sister     Alcohol abuse Paternal Aunt          from alcohol abuse    Other Maternal Grandmother         Alzheimer’s Disease    Cancer Paternal Grandfather         Unsure    Breast cancer Neg Hx      Social History     Socioeconomic History    Marital status:    Tobacco Use    Smoking status: Former     Packs/day: 0.25     Years: 1.00     Pack years: 0.25     Types: Cigarettes     Quit date:      Years since quittin.5    Smokeless tobacco: Never    Tobacco comments:     Smoked for approx a year from age [age]   Vaping Use    Vaping Use: Never used   Substance and Sexual Activity    Alcohol use: Not Currently     Comment: Occasional drink    Drug use: Never    Sexual activity: Not Currently     Partners: Male     Birth control/protection: Hysterectomy       Review of Systems:  Review of Systems   Constitutional:  Positive for activity change, chills and fever.   HENT:  Negative for congestion, hearing loss and rhinorrhea.    Eyes:  Negative for photophobia and visual disturbance.   Respiratory:  Negative for choking and shortness of breath.    Cardiovascular:   Negative for chest pain and leg swelling.   Gastrointestinal:  Positive for abdominal pain and diarrhea. Negative for vomiting.   Endocrine: Negative for polyphagia and polyuria.   Genitourinary:  Negative for difficulty urinating, frequency and urgency.   Musculoskeletal:  Negative for back pain and myalgias.   Skin:  Negative for pallor and rash.   Neurological:  Negative for dizziness, tremors and numbness.   Hematological:  Negative for adenopathy.   Psychiatric/Behavioral:  Negative for agitation, decreased concentration and self-injury.    Otherwise the 12 point review of systems is negative.    /71 (BP Location: Right arm, Patient Position: Lying)   Pulse 99   Temp 99.4 °F (37.4 °C) (Axillary)   Resp 18   SpO2 97%   There is no height or weight on file to calculate BMI.    General: Pleasantly conversant  HEENT: PER, no icterus, normal sclerae  Cardiac: regular rhythm,  no audible rubs  Pulmonary: bilateral breath sounds, nonlabored  Abdominal: Large area of erythema in the mid abdomen, fluctuant, tender  Neurologic: awake, alert, no obvious focal deficits  Extremities: warm, no edema  Skin: no obvious rashes nor worrisome lesions seen     CBC  Results from last 7 days   Lab Units 07/21/23  0302   WBC 10*3/mm3 10.45   HEMOGLOBIN g/dL 8.6*   HEMATOCRIT % 27.7*   PLATELETS 10*3/mm3 544*       CMP  Results from last 7 days   Lab Units 07/21/23  0302   SODIUM mmol/L 139   POTASSIUM mmol/L 4.0   CHLORIDE mmol/L 105   CO2 mmol/L 21.0*   BUN mg/dL 13   CREATININE mg/dL 0.67   CALCIUM mg/dL 8.4*   BILIRUBIN mg/dL <0.2   ALK PHOS U/L 155*   ALT (SGPT) U/L 26   AST (SGOT) U/L 26   GLUCOSE mg/dL 158*       Radiology  CT obtained on 7/20/2023 reviewed    Assessment:  Abdominal wall abscess  History of appendiceal cancer (care at [facility], [place])    Plan:  This young lady has a terrible problem.  She has evidence of likely malignant disease on imaging.  The etiology of this abscess is unclear but is most likely  related to her intra-abdominal malignancy.  At this point, I would percutaneously drain this abscess though it is quite superficial in case this does communicate directly with the bowel underneath,  essentially a malignant fistula.  I will have the fluid sent for culture and cytology.  I discussed this directly with Dr. Pinto our interventional radiologist.  This was discussed in depth with the patient and her family.  Getting control of the infection is paramount.  We will have infectious disease see her also.  Ultimately this young lady should be transferred to an institution with surgical oncology whom cares for advanced appendiceal cancer.     Inocente Bowden MD  07/21/23  12:45 EDT

## 2023-07-21 NOTE — NURSING NOTE
[facility] consulted for: AB    Wound/ Skin Assessment: After much investigation Woc found that patient had 2 separate skin issues going on.  1 is a chronic open wound that occasionally drains a little bit under the right breast from several surgeries.  Per patient her doctor has said this is a retained suture that has not dissolved all the way however this was 2-1/2 years ago.    The other issue is the abdominal cellulitis on the lower abdomen near the umbilicus and it is outlined with a black marker.  There is nothing wound care can do for this and she has other doctors on board for this.    As far as the chronic wound that occasionally drains patient states that for a while there they were having her silver nitrate at but after she would silver nitrate it it the wound would drain out down her chest with the silver nitrate medicine burning her skin and streaks.  She was told to stop by her PCP    At first the wound looks severely dry and crusty it is a dent the skin goes down and then stops and then there is what appears to be after nonselective debridement hypertrophied tissue.  Unclear if this is just inflamed tissue that is surrounding the retained suture or not.  The distal aspect of the wound in the crevice that it creates is red and and this is most likely just irritation from the drainage.  When bandage was removed there was a scant amount of yellow-tan drainage.  Patient states that is about all the drainage that there is however that if she does not clean it every day it does start to smell.    Recommendations/ Intervention performed: [facility] recommends cleansing with Vashe soak for 3 minutes then lightly debriding wound by removing the soaked gauze rubbing around before removal.  At this time I am just covering it with a Mepilex light to absorb any drainage and covering it with a bandage.  At this time I am just trying to see if the Vashe eliminates enough bioburden to fight the chronicity of this wound and help  "close.  If this is not enough patient was given Hydrofiber silver, Hydrofera Blue, Ofelia Ag.  In the hopes that 1 of these advanced wound care products could neutralize the bioburden and reverse the inflammatory state of this area and put it in healing.    Patient was and  were educated on the care for this but is is extensive and there are 4 different products.  They were told to give the first product about a week if there is no improvement switch to the next product given a week or 2 and switch so on and so on into the last product.  I will Charis copy and post the instructions from the wound care in this note right now:  Instructions: soak gauze or cotton ball in vashe and lay on wound bed for three minutes; rub wound bed with gauze or cottonball when removing for debridement; for the first week try just the mepilex light: cut a small sqaure, place directly on wound bed and cover with 2'x2\" bandaid. IF no improvement in either drainage or size (I.e. not getting smaller) then incorporate one of the three other advanced wound care dressings,  1) hydrofiber with silver (opticell AG, Aquacell AG)  - after vashe soak, apply small square to wound bed and cover wth bandage; change daily  2) HYDROFERA BLUE- after vashe soak dampen small square of hydrofera blue with normal saline and apply to wound bed, cover with bandage and change daily  3) Ofelia AG (or other collagen with silver) - after vashe soak, place a small piece over wound bed (enough to cover entirely) and cover with bandage; change daily - do not need to remove any old collagen that isn't removed with showering or vashe soak    However if it is retained suture that does not dissolve but it keeps causing the irritation hypertrophy may remain and therefore the wound would never close.  Not sure why somebody has not gone back in there and just removed this little bit of retained suture and then just do some packing or protect Ofelia in there and help this " wound closed up.  Never heard of a suture knot dissolving for this long.  Also it might not be a suture anymore but scar tissue.    Encourage patient to follow-up with the PCP and seek further help for this if no improvement with the wound care products that patient was given.      Skin interventions in place.    Pressure Injury Protocol (initiate for Jer Score of 18 or less):   *Maintain good skin care, keep dry, turn q 2 hr, keep heels elevated and offloaded with heel boots.    *Apply z-guard to sacrococcygeal area/ perineal area BID or daily and PRN per incontinent episodes.  *Follow C.A.R.E protocol if medical devices (Bipap, bell, Ng tube, etc) are being used.     Specialty bed: Please inflate waffle if patient states regular bed is not comfortable.    Woc will follow.    Please contact with questions or concerns.

## 2023-07-21 NOTE — CONSULTS
[facility]    INFECTIOUS DISEASE CONSULT/INITIAL HOSPITAL VISIT    Patricia Castillo  1975  [id number]    Date of consult: 7/21/2023    Admit date: 7/20/2023    Requesting Provider: Sendy Rios DO  Evaluating physician: James Jansen MD  Reason for Consultation: Abdominal wall abscess  Chief Complaint: Above      Subjective   History of present illness:  Patient is a  47 y.o.  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  Developed purulent drainage and was evaluated by primary care and placed on Bactrim.  Then admitted to [facility] where CT scan revealed findings consistent with abdominal wall abscess.  The patient was transferred and admitted to [facility] on 7/20/2023.  I was consulted on 7/21/2023.  The patient has no other localizing signs or symptoms of infection.  She denies ill contacts, TB, HIV, zoonotic exposures.    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    Cancer 8-2013    Appendiceal Cancer    Headache 1985    Heart murmur 1988       Past Surgical History:   Procedure Laterality Date    APPENDECTOMY  8-5-2013    Appendix removed cancer found    CHOLECYSTECTOMY  10-1-2013    Removed during cancer debulking surgery    COLON SURGERY  10-1-2014    Partial removal during cancer debulking surgery    COLONOSCOPY  9-30-13    HYSTERECTOMY  8-5-2013    Full hysterectomy    OOPHORECTOMY      SMALL INTESTINE SURGERY  10-1-2013    Partial removal during cancer debulking & HIPEC    TONSILLECTOMY  1993       Pediatric History   Patient Parents    Not on file     Other Topics Concern    Not on file   Social History Narrative     Not on file   Previous smoking history but negative since 29 years ago.  No alcohol or drug use.  .    family history includes Alcohol abuse in her paternal aunt and sister; Anxiety disorder in her sister; Cancer in her paternal grandfather; Hyperlipidemia in her mother; Other in her maternal grandmother and mother.    Allergies   Allergen Reactions    Chloraseptic Sore Throat [Acetaminophen] Hives    Phenol Hives and Rash       Immunization History   Administered Date(s) Administered    COVID-19 (MODERNA) 1st,2nd,3rd Dose Monovalent 03/09/2021, 04/07/2021, 11/07/2021    COVID-19 (PFIZER) BIVALENT 12+YRS 09/30/2022    FluLaval/Fluzone >6mos 09/21/2018, 10/15/2020, 10/03/2021, 09/30/2022    Meningococcal B,(Bexsero) 10/29/2020, 11/29/2020    Meningococcal MCV4P (Menactra) 10/30/2020    Pneumococcal Conjugate 13-Valent (PCV13) 10/30/2020    Pneumococcal Polysaccharide (PPSV23) 12/26/2020    Tdap 12/06/2022       Medication:    Current Facility-Administered Medications:     aspirin-acetaminophen-caffeine (EXCEDRIN MIGRAINE) 250-250-65 MG per tablet 1 tablet, 1 tablet, Oral, Q6H PRN, Uma, Eliza, APRN, 1 tablet at 07/21/23 0347    sennosides-docusate (PERICOLACE) 8.6-50 MG per tablet 2 tablet, 2 tablet, Oral, BID **AND** polyethylene glycol (MIRALAX) packet 17 g, 17 g, Oral, Daily PRN **AND** bisacodyl (DULCOLAX) EC tablet 5 mg, 5 mg, Oral, Daily PRN **AND** bisacodyl (DULCOLAX) suppository 10 mg, 10 mg, Rectal, Daily PRN, Uma, Eliza, APRN    Calcium Replacement - Follow Nurse / BPA Driven Protocol, , Does not apply, PRN, Uma, Eliza, APRN    HYDROcodone-acetaminophen (NORCO) 5-325 MG per tablet 1 tablet, 1 tablet, Oral, Q4H PRN, Uma, Eliza, APRN, 1 tablet at 07/21/23 0936    lactobacillus acidophilus (RISAQUAD) capsule 1 capsule, 1 capsule, Oral, Daily, Uma, Eliza, APRN, 1 capsule at 07/21/23 0936    Linezolid (ZYVOX) 600 mg 300 mL, 600 mg, Intravenous, Q12H, Eliza Eaton,  APRN, Last Rate: 300 mL/hr at 07/21/23 0215, 600 mg at 07/21/23 0215    Magnesium Standard Dose Replacement - Follow Nurse / BPA Driven Protocol, , Does not apply, PRN, Uma, Eliza, APRN    multivitamin with minerals 1 tablet, 1 tablet, Oral, Daily, Uma, Eliza, APRN, 1 tablet at 07/21/23 0936    Phosphorus Replacement - Follow Nurse / BPA Driven Protocol, , Does not apply, PRN, Uma, Eliza, APRN    piperacillin-tazobactam (ZOSYN) 3.375 g in iso-osmotic dextrose 50 ml (premix), 3.375 g, Intravenous, Q8H, Uma, Eliza, APRN, 3.375 g at 07/21/23 0937    Potassium Replacement - Follow Nurse / BPA Driven Protocol, , Does not apply, PRN, Uma, Eliza, APRN    Please refer to the medical record for a full medication list    Review of Systems:    Constitutional--some o Fever, no chills or sweats.  Appetite good, and no malaise. No fatigue.  HEENT-- No new vision, hearing or throat complaints.  No epistaxis or oral sores.  Denies odynophagia or dysphagia.  No odynophagia or dysphagia. No headache, photophobia or neck stiffness.  CV-- No chest pain, palpitation or syncope  Resp-- No SOB/cough/Hemoptysis  GI- No nausea, vomiting, or diarrhea.  No hematochezia, melena, or hematemesis. Denies jaundice or chronic liver disease.  Abdominal wall pain as per HPI  -- No dysuria, hematuria, or flank pain.  Denies hesitancy, urgency or flank pain.  Lymph- no swollen lymph nodes in neck/axilla or groin.   Heme- No active bruising or bleeding; no Hx of DVT or PE.  MS-- no swelling or pain in the bones or joints of arms/legs.  No new back pain.  Neuro-- No acute focal weakness or numbness in the arms or legs.  No seizures.  Skin--No rashes or lesions, except abdominal wall as per HPI    Physical Exam:   Vital Signs   Temp:  [96.6 °F (35.9 °C)-100.4 °F (38 °C)] 99.4 °F (37.4 °C)  Heart Rate:  [] 99  Resp:  [18] 18  BP: ()/(56-71) 104/71    Temp  Min: 96.6 °F (35.9 °C)  Max: 100.4 °F (38 °C)  BP  Min:  97/58  Max: 109/66  Pulse  Min: 86  Max: 101  Resp  Min: 18  Max: 18  SpO2  Min: 96 %  Max: 100 %    Blood pressure 104/71, pulse 99, temperature 99.4 °F (37.4 °C), temperature source Axillary, resp. rate 18, SpO2 97 %.  GENERAL: Awake and alert, in moderate distress. Appears older than stated age.  HEENT:  Normocephalic, atraumatic.  Oropharynx without thrush. Dentition in good repair. No cervical adenopathy. No neck masses.  Ears externally normal, Nose externally normal.  EYES: PERRL. No conjunctival injection. No icterus. EOM full.  LYMPHATICS: No lymphadenopathy of the neck or axillary or inguinal regions.   HEART: No murmur, gallop, or pericardial friction rub. Reg rate rhythm, No JVD at 45 degrees.  LUNGS: Clear to auscultation and percussion. No respiratory distress, no use of accessory muscles.  ABDOMEN: Soft, tender to deep palpation along previous surgical site without crepitus or bullae, with some purulent discharge, nondistended. No appreciable HSM.  Bowel sounds normal.  GENITAL: No external lesions, breasts without masses, back straight, no CVAT, rectal external without lesions.   SKIN: Warm and dry without cutaneous eruptions.  No nodules.  Area under right breast old track small wound, no pus or erythema.  Abd wall below umbilicus about 7 cm long with erythema, induration, no crepitus, fluctuance.  Increased heat.  PSYCHIATRIC: Mental status lucid. No confusion.  EXT:  No cellulitic change.  NEURO: Oriented to name, CN 2 to 12 intact, DTR 1 + and symmetric, sensory intact to LT upper and lower extremitiy, motor 5/5 upper and lower extremity, cerebellar and gait not tested.    7/21/23    Results Review:   I reviewed the patient's new clinical results.  I reviewed the patient's new imaging results and agree with the interpretation.  I reviewed the patient's other test results and agree with the interpretation    Results from last 7 days   Lab Units 07/21/23  0302 07/21/23  0006 07/20/23  1542   WBC  10*3/mm3 10.45 8.83 11.29*   HEMOGLOBIN g/dL 8.6* 9.3* 10.3*   HEMATOCRIT % 27.7* 29.1* 32.3*   PLATELETS 10*3/mm3 544* 533* 605*     Results from last 7 days   Lab Units 07/21/23  0302   SODIUM mmol/L 139   POTASSIUM mmol/L 4.0   CHLORIDE mmol/L 105   CO2 mmol/L 21.0*   BUN mg/dL 13   CREATININE mg/dL 0.67   GLUCOSE mg/dL 158*   CALCIUM mg/dL 8.4*     Results from last 7 days   Lab Units 07/21/23  0302   ALK PHOS U/L 155*   BILIRUBIN mg/dL <0.2   ALT (SGPT) U/L 26   AST (SGOT) U/L 26         Results from last 7 days   Lab Units 07/21/23  0916   CRP mg/dL 17.88*         Results from last 7 days   Lab Units 07/21/23  0006   LACTATE mmol/L 1.0     Estimated Creatinine Clearance: 85.8 mL/min (by C-G formula based on SCr of 0.67 mg/dL).     Procalitonin Results:      Lab 07/21/23  0006 07/20/23  1542   PROCALCITONIN 0.14 0.16      Brief Urine Lab Results       None           No results found for: SITE, ALLENTEST, PHART, WCF9MBI, PO2ART, MIX1LDE, BASEEXCESS, V9RGRTNT, HGBBG, HCTABG, OXYHEMOGLOBI, METHHGBN, CARBOXYHGB, CO2CT, BAROMETRIC, MODALITY, FIO2     Microbiology:  No results found for: ACANTHNAEG, AFBCX, BPERTUSSISCX, BLOODCX  No results found for: BCIDPCR, CXREFLEX, CSFCX, CULTURETIS  No results found for: CULTURES, HSVCX, URCX  No results found for: EYECULTURE, GCCX, HSVCULTURE, LABHSV  No results found for: LEGIONELLA, MRSACX, MUMPSCX, MYCOPLASCX  No results found for: NOCARDIACX, STOOLCX  No results found for: THROATCX, UNSTIMCULT, URINECX, CULTURE, VZVCULTUR  No results found for: VIRALCULTU, WOUNDCX     No results found for: TISSCXQ, CULTURES, CULTURE, BLOODCX, ANACX, BALCX, ACIDFASTCX, BODYFLDCX, CXREFLEX, FUNGUSCX, RESPCX, MRSACX, ROUTCX, BRCHWSHCLT, TISSUECX, URINECX, CMVCX, WOUNDCX, BCIDPCR, BFCULTURE, BLOODCULT(      Radiology:  Imaging Results (Last 72 Hours)       ** No results found for the last 72 hours. **            IMPRESSION:     1.  Abdominal wall abscess with 6.5 x 3.5 cm air-fluid collection  seen on CT scan at [facility] on 7/20/2023.  Usual organisms are staphylococci versus potentially bowel vernon given the history of previous surgeries for intra-abdominal pathology.  The abdominal wall abscess may also be related to metastatic cancer to the abdominal wall.  Previous biologic mesh used 2021 per report in abdominal surgery per patient.  Doubt related.  2.  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  3.  History of appendiceal carcinoma status post debulking procedures x3 at [facility].  Data deficit.  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  Surgery October 2013, November 2015, January 2021 with intraperitoneal cisplatin.  Previous CT scan at [facility] in February 2023 with noted increase in mucinous peritoneal tumor implants adjacent to gastric fundus, right lower quadrant, supraumbilical, right pelvis, left pelvic implant.  Previous right hemicolectomy, cholecystectomy, splenectomy, omentectomy, hysterectomy, and bilateral salpingo-oophorectomy.  Previous chronic right upper quadrant wound was managed by local wound care per the [facility] note February 2023.  Edgard Frances MD.  4.  Anemia, chronic disease.  5.  Elevated alkaline phosphatase 155, CT scan of the abdomen and pelvis without obvious liver pathology.    RECOMMENDATIONS:    1.  Diagnostically, continue to follow patient's physical exam, CBC, CMP, CRP, blood cultures x2, abdominal wall culture for routine C&S, AFB, and fungus.  Follow CPK weekly while on IV daptomycin.  2.  Therapeutically, consider treatment with daptomycin and piperacillin/tazobactam pending further culture data.  Duration of therapy to be determined.  3.  Supportive care.  Possible I and D in am.  Also d/w [facility].    I discussed the patient's findings and my recommendations with patient and nursing staff    Thank you  for asking me to see Patricia Castillo.  Our group would be pleased to follow this patient over the course of their hospitalization and assist with outpatient antimicrobial therapy, as indicated.  Further recommendations depend on the results of the cultures and clinical course.  Increased risk for adverse drug reactions, complications of IV access, readmission.  See next on Monday, call sooner if needed.  D/w patients , and Dr. Melara.    James Jansen MD  7/21/2023

## 2023-07-21 NOTE — PLAN OF CARE
Problem: Adult Inpatient Plan of Care  Goal: Plan of Care Review  Outcome: Ongoing, Progressing  Goal: Patient-Specific Goal (Individualized)  Outcome: Ongoing, Progressing  Goal: Absence of Hospital-Acquired Illness or Injury  Outcome: Ongoing, Progressing  Intervention: Identify and Manage Fall Risk  Recent Flowsheet Documentation  Taken 7/20/2023 2230 by Marina Osuna RN  Safety Promotion/Fall Prevention:   activity supervised   clutter free environment maintained   safety round/check completed   room organization consistent   lighting adjusted  Intervention: Prevent Skin Injury  Recent Flowsheet Documentation  Taken 7/20/2023 2230 by Marina Osuna RN  Body Position: position changed independently  Skin Protection:   adhesive use limited   incontinence pads utilized   tubing/devices free from skin contact   transparent dressing maintained   skin-to-device areas padded   skin-to-skin areas padded  Intervention: Prevent and Manage VTE (Venous Thromboembolism) Risk  Recent Flowsheet Documentation  Taken 7/20/2023 2230 by Marina Osuna RN  Activity Management: up ad leesa  Range of Motion: active ROM (range of motion) encouraged  Intervention: Prevent Infection  Recent Flowsheet Documentation  Taken 7/20/2023 2230 by Marina Osuna RN  Infection Prevention:   environmental surveillance performed   single patient room provided   rest/sleep promoted  Goal: Optimal Comfort and Wellbeing  Outcome: Ongoing, Progressing  Intervention: Provide Person-Centered Care  Recent Flowsheet Documentation  Taken 7/20/2023 2230 by Marina Osuna RN  Trust Relationship/Rapport:   care explained   emotional support provided   questions encouraged   thoughts/feelings acknowledged  Goal: Readiness for Transition of Care  Outcome: Ongoing, Progressing  Intervention: Mutually Develop Transition Plan  Recent Flowsheet Documentation  Taken 7/20/2023 2226 by Marina Osuna RN  Transportation Anticipated: family or friend will  provide  Patient/Family Anticipated Services at Transition: none  Patient/Family Anticipates Transition to: home with family  Taken 7/20/2023 2214 by Marina Osuna RN  Equipment Currently Used at Home: none   Goal Outcome Evaluation:

## 2023-07-21 NOTE — PROGRESS NOTES
Malnutrition Severity Assessment    Patient Name:  Patricia Castillo  YOB: 1975  MRN: [id number]  Admit Date:  7/20/2023    Patient meets criteria for : Severe Malnutrition (PO intakes <75% EEN and loss 2.5% X past week)    Malnutrition Severity Assessment  Malnutrition Type: Acute Disease or Injury - Related Malnutrition  Malnutrition Type (last 8 hours)       Malnutrition Severity Assessment       Row Name 07/21/23 1428       Malnutrition Severity Assessment    Malnutrition Type Acute Disease or Injury - Related Malnutrition      Row Name 07/21/23 1428       Insufficient Energy Intake     Insufficient Energy Intake Findings Severe  PO intakes <75% EEN X past week    Insufficient Energy Intake  <75% of est. energy requirement for >7d)      Row Name 07/21/23 1428       Unintentional Weight Loss     Unintentional Weight Loss Findings Severe  Loss 2.5% body weight X past week    Unintentional Weight Loss  Weight loss greater than 2% in one week      Row Name 07/21/23 1428       Criteria Met (Must meet criteria for severity in at least 2 of these categories: M Wasting, Fat Loss, Fluid, Secondary Signs, Wt. Status, Intake)    Patient meets criteria for  Severe Malnutrition  PO intakes <75% EEN and loss 2.5% X past week                    Electronically signed by:  Orly Carson RD  07/21/23 14:30 EDT

## 2023-07-21 NOTE — H&P
[facility] Medicine Services  HISTORY AND PHYSICAL    Patient Name: Patricia Castillo  : 1975  MRN: [id number]  Primary Care Physician: Orly Orta APRN  Date of admission: 2023    Subjective   Subjective     Chief Complaint:  Abdominal pain/redness, fever     HPI:  Patricia Castillo is a 47 y.o. female with a past medical history significant for appendiceal cancer with 3 debulking surgeries at [facility] in [place] presented to [facility] with abdominal redness/tenderness and fever.  Patient notes last Tuesday she had some chills however at that time she did not having an redness to her abdomen.  Over the past couple of days she has noticed increased redness to her abdominal wall along with tenderness and intermittent chills.  Patient reports she has been walking on the treadmill several times a week and thinks this could have attributed to her abdominal wall redness/tenderness however today she noticed pus to the top of her skin and was evaluated by her PCP today.  She was started on bactrim and sent to the ED for further evaluation.  Patient denies any shortness of air, chest pain, diarrhea, dysuria or cough.  She does continue to have a headache and is requesting Excedrin.  CT of abdomen and pelvis was obtained at [facility] that showed Abscess within the midline anterior abdominal wall.  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  Dilation within the colon and loops of the small bowel of uncertain etiology, partial bowel obstruction cannot be excluded.  Patient was transferred to [facility] for higher level of care.          Review of Systems   Constitutional:  Positive for appetite change and chills. Negative for activity change, diaphoresis, fatigue, fever and unexpected weight change.   Eyes:  Negative for photophobia and visual disturbance.   Respiratory:  Negative for cough and shortness of breath.     Cardiovascular:  Negative for chest pain, palpitations and leg swelling.   Gastrointestinal:  Positive for abdominal pain. Negative for abdominal distention, blood in stool, constipation, diarrhea, nausea and vomiting.   Genitourinary: Negative.    Musculoskeletal:  Negative for back pain, neck pain and neck stiffness.   Skin:  Positive for color change.   Neurological:  Positive for headaches. Negative for dizziness, facial asymmetry, speech difficulty, weakness, light-headedness and numbness.   Psychiatric/Behavioral: Negative.            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    Cancer 8-2013    Appendiceal Cancer    Headache 1985    Heart murmur 1988             Past Surgical History:   Procedure Laterality Date    APPENDECTOMY  8-5-2013    Appendix removed cancer found    CHOLECYSTECTOMY  10-1-2013    Removed during cancer debulking surgery    COLON SURGERY  10-1-2014    Partial removal during cancer debulking surgery    COLONOSCOPY  9-30-13    HYSTERECTOMY  8-5-2013    Full hysterectomy    OOPHORECTOMY      SMALL INTESTINE SURGERY  10-1-2013    Partial removal during cancer debulking & HIPEC    TONSILLECTOMY  1993       Family History:  family history includes Alcohol abuse in her paternal aunt and sister; Anxiety disorder in her sister; Cancer in her paternal grandfather; Hyperlipidemia in her mother; Other in her maternal grandmother and mother.     Social History:  reports that she quit smoking about 29 years ago. Her smoking use included cigarettes. She has a 0.25 pack-year smoking history. She has never used smokeless tobacco. She reports that she does not currently use alcohol. She reports that she does not use drugs.  Social History     Social History Narrative    Not on file       Medications:  Aspirin-Acetaminophen-Caffeine, Probiotic Product, aspirin, multivitamin with minerals, and sulfamethoxazole-trimethoprim    Allergies   Allergen  Reactions    Chloraseptic Sore Throat [Acetaminophen] Hives    Phenol Hives and Rash       Objective   Objective     Vital Signs:   Temp:  [96.6 °F (35.9 °C)-100.4 °F (38 °C)] 100.4 °F (38 °C)  Heart Rate:  [] 101  Resp:  [18] 18  BP: ()/(56-68) 109/66    Physical Exam  Vitals and nursing note reviewed.   Constitutional:       General: She is not in acute distress.     Appearance: Normal appearance. She is not ill-appearing, toxic-appearing or diaphoretic.   HENT:      Head: Normocephalic and atraumatic.      Nose: Nose normal.      Mouth/Throat:      Mouth: Mucous membranes are dry.      Pharynx: Oropharynx is clear.   Eyes:      Extraocular Movements: Extraocular movements intact.      Conjunctiva/sclera: Conjunctivae normal.      Pupils: Pupils are equal, round, and reactive to light.   Cardiovascular:      Rate and Rhythm: Normal rate and regular rhythm.      Pulses: Normal pulses.      Heart sounds: Normal heart sounds.   Pulmonary:      Effort: Pulmonary effort is normal.      Breath sounds: Normal breath sounds.   Abdominal:      General: Bowel sounds are normal. There is no distension.      Palpations: Abdomen is soft. There is no mass.      Tenderness: There is abdominal tenderness. There is no right CVA tenderness, left CVA tenderness, guarding or rebound.      Hernia: No hernia is present.      Comments: Erythema to mid abdomen, firm and tender to touch.     Musculoskeletal:         General: No swelling, tenderness, deformity or signs of injury. Normal range of motion.      Cervical back: Normal range of motion and neck supple.      Right lower leg: No edema.      Left lower leg: No edema.   Skin:     General: Skin is warm and dry.   Neurological:      General: No focal deficit present.      Mental Status: She is alert and oriented to person, place, and time. Mental status is at baseline.   Psychiatric:         Mood and Affect: Mood normal.         Behavior: Behavior normal.         Thought  Content: Thought content normal.         Judgment: Judgment normal.          Result Review:  I have personally reviewed the results from the time of this admission to 7/21/2023 00:51 EDT and agree with these findings:  [x]  Laboratory list / accordion  [x]  Microbiology  [x]  Radiology  [x]  EKG/Telemetry   []  Cardiology/Vascular   []  Pathology    Old records  []  Other:  Most notable findings include:     LAB RESULTS:      Lab 07/21/23  0006 07/20/23  1542   WBC 8.83 11.29*   HEMOGLOBIN 9.3* 10.3*   HEMATOCRIT 29.1* 32.3*   PLATELETS 533* 605*   NEUTROS ABS 6.50 8.88*   IMMATURE GRANS (ABS) 0.03 0.04   LYMPHS ABS 1.39 1.25   MONOS ABS 0.86 1.06*   EOS ABS 0.03 0.04   MCV 82.7 83.0   PROCALCITONIN  --  0.16   LACTATE 1.0 0.9         Lab 07/20/23  1542   SODIUM 139   POTASSIUM 3.8   CHLORIDE 101   CO2 21.8*   ANION GAP 16.2*   BUN 14   CREATININE 0.70   EGFR 106.2   GLUCOSE 87   CALCIUM 9.3         Lab 07/20/23  1542   TOTAL PROTEIN 7.4   ALBUMIN 3.7   GLOBULIN 3.7   ALT (SGPT) 29   AST (SGOT) 34*   BILIRUBIN 0.2   ALK PHOS 159*                     Brief Urine Lab Results       None          Microbiology Results (last 10 days)       ** No results found for the last 240 hours. **            CT Abdomen Pelvis With Contrast    Result Date: 7/20/2023  FINAL REPORT TECHNIQUE: Routine axial images through the abdomen and pelvis were obtained following IV contrast administration. CLINICAL HISTORY: abdominal wall pain, induration and erythema, hx of appendiceal cancer FINDINGS: Abdomen: The gallbladder is not visualized.  The solid abdominal organs and ureters are unremarkable.  Multiloculated fluid collections are seen in the anterior peritoneum adjacent to the liver worrisome for pseudomyxoma peritonei.  Similar small collections are seen on the left side of the anterior abdomen.  Within the midline anterior bone wall is a 6.5 x 3.5 cm air and fluid collection consistent with abscess.  The distal colon is dilated with  air and fluid.  There are some loops of dilated air and fluid-filled small bowel in the abdomen and pelvis. Pelvis:Probable loculated fluid collections are seen in the pelvis . The urinary bladder is not well visualized.  The uterus and ovaries are not visualized  There is no pelvic or abdominal ascites, adenopathy or acute osseous abnormality.     Impression: Abscess within the midline anterior abdominal wall.  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  Dilation within the colon and loops of small bowel of uncertain etiology, partial bowel obstruction cannot be excluded. Authenticated and Electronically Signed by Jose D Jay M.D. on 07/20/2023 07:03:55 PM         Assessment & Plan   Assessment & Plan       Abdominal wall abscess    History of cancer    Normocytic anemia      47 year old female with a history of appendiceal cancer ( 10 years ago) with 3 debulking surgeries (last in 2021) performed at [facility] in [place], presents to [facility] with abdominal pain, redness and fever.      1) Abdominal wall abscess      ? Partial small bowel obstruction   -CT of abdomen and pelvis mentioned above  -BC x2 pending   -start linezolid and zosyn   -pain control   -NPO   -repeat labs pending   -lactic/procal pending   -IVF   -consult general surgery in am   -ID consult     2) Normocytic anemia  -type and screen   -check anemia studies   -monitor     3) Hx of Appendiceal cancer  -diagnosed 10 years ago   -s/p 3 debulking surgeries, last in 2021  -follows with [facility] in [place]        DVT prophylaxis:  scds    CODE STATUS:  Full Code        Expected Discharge  TBD   This note has been completed as part of a split-shared workflow.     Signature:Electronically signed by ELISA Ball, 07/21/23, 1:04 AM EDT.    Total APC time spent: 65 minis  Total attending time spent: 20 minutes  Time spent includes time reviewing chart, face-to-face time, counseling  patient/family/caregiver, ordering medications/tests/procedures, communicating with other health care professionals, documenting clinical information in the electronic health record, and coordination of care.      Attending   Admission Attestation       I have performed an independent face-to-face diagnostic evaluation including performing an independent physical examination as documented here.  The documented plan of care above was reviewed and developed with the advanced practice clinician (APC).      Brief Summary Statement:   Patricia Castillo is a 47 y.o. female With discharge on worsening renal function, dyspnea on exertion with a PMH significant for appendiceal cancer following at [facility] in [place] s/p 3 debulking surgeries who comes as a transfer due to abdominal pain, redness and fever.  Patient reports onset of chills this past Tuesday.  Since that time she has had increased redness to her abdomen.  She was seen by her PCP today and started on Bactrim for possible infection instructed to come to the ED for further evaluation.    Remainder of detailed HPI is as noted by APC and has been reviewed and/or edited by me for completeness.    Attending Physical Exam:  Temp:  [96.6 °F (35.9 °C)-100.4 °F (38 °C)] 100.4 °F (38 °C)  Heart Rate:  [] 101  Resp:  [18] 18  BP: ()/(56-68) 109/66    Constitutional: Awake, alert  Eyes: PERRLA, sclerae anicteric, no conjunctival injection  HENT: NCAT, mucous membranes moist  Neck: Supple, no thyromegaly, no lymphadenopathy, trachea midline  Respiratory: Clear to auscultation bilaterally, nonlabored respirations   Cardiovascular: RRR, no murmurs, rubs, or gallops, palpable pedal pulses bilaterally  Gastrointestinal: Positive bowel sounds, soft, nontender, nondistended  Musculoskeletal: No bilateral ankle edema, no clubbing or cyanosis to extremities  Psychiatric: Appropriate affect, cooperative  Neurologic: Oriented x 3, strength symmetric in all extremities, Cranial  Nerves grossly intact to confrontation, speech clear  Skin: Erythema, warmth with fluctuance/induration to mid abdomen extending bilaterally      Brief Assessment/Plan :  See detailed assessment and plan developed with APC which I have reviewed and/or edited for completeness.            Bertha Martinez,   07/21/23

## 2023-07-21 NOTE — CASE MANAGEMENT/SOCIAL WORK
Discharge Planning Assessment  [facility]     Patient Name: Patricia Castillo  MRN: [id number]  Today's Date: 7/21/2023    Admit Date: 7/20/2023    Plan: home   Discharge Needs Assessment       Row Name 07/21/23 0815       Living Environment    People in Home spouse    Name(s) of People in Home , Brayan    Current Living Arrangements home    Primary Care Provided by self       Transition Planning    Patient/Family Anticipates Transition to home with family       Discharge Needs Assessment    Readmission Within the Last 30 Days no previous admission in last 30 days    Equipment Currently Used at Home none    Concerns to be Addressed discharge planning;basic needs                   Discharge Plan       Row Name 07/21/23 0815       Plan    Plan home    Patient/Family in Agreement with Plan yes    Plan Comments I met with the patient at the bedside. She [facility] [place] her  [facility]. She is independent with activities of daily living and mobility. She anticipates returning home after this hospitalization and her  can transport. Case management will continue to follow her plan of care and assist with any discharge planning needs.    Final Discharge Disposition Code 01 - home or self-care                  Continued Care and Services - Admitted Since 7/20/2023    Coordination has not been started for this encounter.       Expected Discharge Date and Time       Expected Discharge Date Expected Discharge Time    Jul 28, 2023            Demographic Summary       Row Name 07/21/23 0814       General Information    General Information Comments I confirmed that Orly Orta is Ms Castillo's PCP and she has [facility] for insurance                   Functional Status       Row Name 07/21/23 0814       Functional Status, IADL    Medications independent    Meal Preparation independent    Housekeeping independent    Laundry independent    Shopping independent                   Psychosocial    No  documentation.                  Abuse/Neglect    No documentation.                  Legal    No documentation.                  Substance Abuse    No documentation.                  Patient Forms    No documentation.                     Kia Love RN

## 2023-07-21 NOTE — PROGRESS NOTES
Dr. Bowden has requested percutaneous drainage catheter placement in abdominal wall collection which has a likely fistulous communication with bowel. The patient has previously been treated at [facility] in [facility]. Per IR Rns, she is requesting input from her treatment team from [facility] before moving ahead with any intervention. We respect the patient's desires and will be on the standby should IR be needed to perform the procedure. Thank you.

## 2023-07-21 NOTE — PROGRESS NOTES
[facility] Medicine Services  PROGRESS NOTE    Patient Name: Patricia Castillo  : 1975  MRN: [id number]    Date of Admission: 2023  Primary Care Physician: Orly Orta APRN    Subjective   Subjective     CC:  Abdominal wall abscess    HPI:  Mild abdominal discomfort.  Has an open lesion under her right breast that she has been self treating for months  + loose stool. No N/V  Headache    ROS:  Gen: fevers  Pulm: no cough  GI: no nausea, otherwise as above     Objective   Objective     Vital Signs:   Temp:  [96.6 °F (35.9 °C)-100.4 °F (38 °C)] 99.4 °F (37.4 °C)  Heart Rate:  [] 99  Resp:  [18] 18  BP: ()/(56-71) 104/71     Physical Exam:  Constitutional - no acute distress, nontoxic, sitting up in chair  HEENT-NCAT, mucous membranes moist  CV-RRR  Resp-CTAB  Abd-soft, mild distention and erythema under will healed midline incision.  1 cm opening under right breast, slight drainage  Ext-No lower extremity cyanosis, clubbing or edema bilaterally  Neuro-alert and oriented, speech clear, moves all extremities   Psych-normal affect   Skin- No rash on exposed UE or LE bilaterally      Results Reviewed:  LAB RESULTS:      Lab 23  0302 23  0006 23  1542   WBC 10.45 8.83 11.29*   HEMOGLOBIN 8.6* 9.3* 10.3*   HEMATOCRIT 27.7* 29.1* 32.3*   PLATELETS 544* 533* 605*   NEUTROS ABS 7.75* 6.50 8.88*   IMMATURE GRANS (ABS) 0.03 0.03 0.04   LYMPHS ABS 1.46 1.39 1.25   MONOS ABS 1.17* 0.86 1.06*   EOS ABS 0.02 0.03 0.04   MCV 82.2 82.7 83.0   PROCALCITONIN  --  0.14 0.16   LACTATE  --  1.0 0.9         Lab 23  0302 23  0006 23  1542   SODIUM 139 140 139   POTASSIUM 4.0 3.7 3.8   CHLORIDE 105 106 101   CO2 21.0* 20.0* 21.8*   ANION GAP 13.0 14.0 16.2*   BUN 13 13 14   CREATININE 0.67 0.70 0.70   EGFR 107.3 106.2 106.2   GLUCOSE 158* 102* 87   CALCIUM 8.4* 8.5* 9.3         Lab 23  0302 23  0006 23  1542   TOTAL PROTEIN 5.7* 6.5  7.4   ALBUMIN 3.0* 3.1* 3.7   GLOBULIN 2.7 3.4 3.7   ALT (SGPT) 26 29 29   AST (SGOT) 26 29 34*   BILIRUBIN <0.2 <0.2 0.2   ALK PHOS 155* 161* 159*                 Lab 07/21/23  0302 07/21/23  0006   IRON  --  7*  7*   IRON SATURATION (TSAT)  --  3*   TIBC  --  271*   TRANSFERRIN  --  182*   FERRITIN  --  90.24   ABO TYPING A  --    RH TYPING Positive  --    ANTIBODY SCREEN Negative  --          Brief Urine Lab Results       None            Microbiology Results Abnormal       None            CT Abdomen Pelvis With Contrast    Result Date: 7/20/2023  FINAL REPORT TECHNIQUE: Routine axial images through the abdomen and pelvis were obtained following IV contrast administration. CLINICAL HISTORY: abdominal wall pain, induration and erythema, hx of appendiceal cancer FINDINGS: Abdomen: The gallbladder is not visualized.  The solid abdominal organs and ureters are unremarkable.  Multiloculated fluid collections are seen in the anterior peritoneum adjacent to the liver worrisome for pseudomyxoma peritonei.  Similar small collections are seen on the left side of the anterior abdomen.  Within the midline anterior bone wall is a 6.5 x 3.5 cm air and fluid collection consistent with abscess.  The distal colon is dilated with air and fluid.  There are some loops of dilated air and fluid-filled small bowel in the abdomen and pelvis. Pelvis:Probable loculated fluid collections are seen in the pelvis . The urinary bladder is not well visualized.  The uterus and ovaries are not visualized  There is no pelvic or abdominal ascites, adenopathy or acute osseous abnormality.     Impression: Abscess within the midline anterior abdominal wall.  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  Dilation within the colon and loops of small bowel of uncertain etiology, partial bowel obstruction cannot be excluded. Authenticated and Electronically Signed by Jose D Jay M.D. on 07/20/2023  07:03:55 PM         Current medications:  Scheduled Meds:lactobacillus acidophilus, 1 capsule, Oral, Daily  Linezolid, 600 mg, Intravenous, Q12H  multivitamin with minerals, 1 tablet, Oral, Daily  piperacillin-tazobactam, 3.375 g, Intravenous, Q8H  senna-docusate sodium, 2 tablet, Oral, BID      Continuous Infusions:lactated ringers, 100 mL/hr, Last Rate: 100 mL/hr (07/21/23 0147)      PRN Meds:.  aspirin-acetaminophen-caffeine    senna-docusate sodium **AND** polyethylene glycol **AND** bisacodyl **AND** bisacodyl    Calcium Replacement - Follow Nurse / BPA Driven Protocol    HYDROcodone-acetaminophen    Magnesium Standard Dose Replacement - Follow Nurse / BPA Driven Protocol    Phosphorus Replacement - Follow Nurse / BPA Driven Protocol    Potassium Replacement - Follow Nurse / BPA Driven Protocol    Assessment & Plan   Assessment & Plan     Active Hospital Problems    Diagnosis  POA    Abdominal wall abscess [L02.211]  Yes    History of cancer [Z85.9]  Not Applicable    Normocytic anemia [D64.9]  Yes      Resolved Hospital Problems   No resolved problems to display.        Brief Hospital Course to date:  Patricia Castillo is a 47 y.o. 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          CODE STATUS:   Code Status and Medical Interventions:   Ordered at: 07/21/23 0111     Level Of Support Discussed With:    Patient     Code Status (Patient has no pulse and is not breathing):    CPR (Attempt to Resuscitate)     Medical Interventions (Patient has pulse or is breathing):    Full Support     Release to patient:    Routine Release       Anthony Melara MD  07/21/23

## 2023-07-21 NOTE — NURSING NOTE
"  ACC REVIEW REPORT: [facility]        PATIENT NAME: Patricia Castillo    PATIENT ID: [id number]        COVID-19 ACC SCREENING       DOES THE PATIENT HAVE A FEVER GREATER THAN OR EQUAL .4: N    IS THE PATIENT EXPERIENCING SHORTNESS OF BREATH: N    DOES THE PATIENT HAVE A COUGH: N    DOES THE PATIENT HAVE ANY OF THE FOLLOWING RISK FACTORS:    EXPOSURE TO SUSPECTED OR KNOWN COVID-19: N    RECENT TRAVEL HISTORY TO ENDEMIC AREA (DOMESTIC/LOCAL): N    IS THE PATIENT A HEALTHCARE WORKER: N    HAS THE PATIENT EXPERIENCED A LOSS OF SENSE OF TASTE OR SMELL: N    HAS THE PATIENT BEEN TESTED FOR COVID-19: N    DATE TESTED:     LAB TESTING SENT TO:           BED: S575    BED TYPE: TELE    BED GIVEN TO: BYRON ADAMS RN    TIME BED GIVEN: 2010    TODAY'S DATE: 7/20/2023    TRANSFER DATE: 7/20/23    ETA: WILL CALL WHEN PT LEAVES OSH    TRANSFERRING FACILITY: [facility]    TRANSFERRING FACILITY PHONE # : [phone]    TRANSFERRING MD: JALYN IBARRA    DATE/TIME REQUEST RECEIVED: 7/20/23 @1854    [facility] RN: PAULA VILLEDA RN    REPORT FROM: BYRON ADAMS RN    TIME REPORT TAKEN: 2010    DIAGNOSIS: ABDOMINAL WALL ABSCESS?    REASON FOR TRANSFER TO [facility]: HIGHER LEVEL OF CARE    TRANSPORTATION: AMBULANCE    CLINICAL REASON FOR TRANSFER TO [facility]: PT PRESENTED TO ER WITH A RED, FIRM ABD WITH TENDERNESS.  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  SHE IS FOLLOWED BY [facility] IN [place].  SHE STATES THAT SHE HAS AN APPT IN A MONTH WITH THEM.  PT WENT TO HE PCP TODAY FOR ABD PAIN AND THE PCP [place] SCRIBED BACTRIUM AND INSTRUCTED TO GO THE ER IF THE PAIN WORSENED.        CLINICAL INFORMATION    HEIGHT: 61.5\"    WEIGHT: 53.5KG    ALLERGIES: PHENOL, CHLORASEPTIC SPRAY    INFECTIOUS DISEASE: N    ISOLATION: N    VITAL SIGNS:   TIME: 2010  TEMP: 98.2  PULSE: 86  B/P: 98/56  RESP: 18        LAB INFORMATION: H/H 10.3/32.3  WBC 11.29  PLATELETS 605    CULTURE INFORMATION:     MEDS/IV FLUIDS: 20G LAC  ZOSYN 3.37G  NS 1L      CARDIAC " SYSTEM:    CHEST PAIN: DENIES    RATE:     SCALE:     RHYTHM: SR    Is patient taking or has patient been given any drugs that could increase bleeding? N    DRUG:      DOSE/FREQUENCY:     TROPONIN:    DATE:   TIME:   TROP:     DATE:   TIME:   TROP:       HEART CATH:     HEART CATH DATE:     HEART CATH RESULTS:     LAD:   RCA:   CX:   LMAIN:   EF:     SWAN:     SITE:   SIZE:    DATE INSERTED:     ARTLINE:     SITE:   SIZE:   DATE INSERTED:     SHEATH:    SITE:   SIZE:   DATE INSERTED:       VASOSEAL:    SITE:   DATE INSERTED:       EXTERNAL PACEMAKER:     RATE:   EXT PACER ON:       MODE:    DATE INSERTED:   OUTPUT SETTING:   SENSITIVITY SETTING:   SENSITIVITY TYPE:       IABP:    SITE:   AUG PRESSURE:   DATE INSERTED:     CARDIAC NOTES:       RESPIRATORY SYSTEM:    LUNG SOUNDS: CTA    ABG DATE:         ABG TIME:     ABG RESULTS:    PH:   PCO2:   PO2:   HCO3:   O2 SAT:       ETT SIZE:     ORAL:     NASAL:     SECURED AT MEASUREMENT (CM):     ON VENTILATOR:    TV:   FI02:   RATE:   PEEP:     OXYGEN: ROOM AIR    O2 SAT: 100%    IMAGING FINDINGS:     PNEUMO CHEST TUBE SEAL TYPE:     RESPIRATORY STATUS:       CNS/MUSCULOSKELETAL    ALERT AND ORIENTED:    PERSON: Y  PLACE: Y  TIME: Y    INJURY:  WHERE:     LORENA COMA SCALE:    E: 4  M: 6  V: 5    STROKE SCALE:     SIZE OF HEMORRHAGE:     SYMPTOMS: (CHOOSE APPROPRIATE)    ASPHASIA:   ATAXIA:   DYSARTHRIA:   DYSPHASIA:   HEADACHE:   PARALYSIS:   SEIZURE:   SYNCOPE:   VERTIGO:   VISION CHANGE:        EXTREMITY WEAKNESS:    LEFT ARM:   RIGHT ARM:   LEFT LEG:   RIGHT LEG:     CAT SCAN RESULTS:     MRI RESULTS:     CNS/MUSCULOSKELETAL NOTES:       GI//GY      ABDOMINAL PAIN: Y    VOMITING: N    DIARRHEA: N    NAUSEA: N    BOWEL SOUNDS: ACTIVE    OCCULT STOOL: N    HEMATURIA: N    NG TUBE:    SIZE:     DATE INSERTED:       ULTRASOUND RESULTS:     ACUTE ABDOMEN RESULTS:     CT SCAN RESULTS:       GI//GY NOTES:     MILLS:     PAST MEDICAL HISTORY: APPENDICEAL  CA  HEADACHE  HEART MURMUR    OTHER SYMPTOM NOTES:     ADDITIONAL NOTES:           Vandana Shook RN  7/20/2023  20:31 EDT

## 2023-07-22 ENCOUNTER — APPOINTMENT (OUTPATIENT)
Dept: CT IMAGING | Facility: HOSPITAL | Age: 48
DRG: 603 | End: 2023-07-22
Payer: COMMERCIAL

## 2023-07-22 LAB
ALBUMIN SERPL-MCNC: 2.8 G/DL (ref 3.5–5.2)
ALBUMIN/GLOB SERPL: 0.8 G/DL
ALP SERPL-CCNC: 154 U/L (ref 39–117)
ALT SERPL W P-5'-P-CCNC: 26 U/L (ref 1–33)
ANION GAP SERPL CALCULATED.3IONS-SCNC: 15 MMOL/L (ref 5–15)
AST SERPL-CCNC: 26 U/L (ref 1–32)
BASOPHILS # BLD AUTO: 0.03 10*3/MM3 (ref 0–0.2)
BASOPHILS NFR BLD AUTO: 0.3 % (ref 0–1.5)
BILIRUB SERPL-MCNC: <0.2 MG/DL (ref 0–1.2)
BUN SERPL-MCNC: 14 MG/DL (ref 6–20)
BUN/CREAT SERPL: 23 (ref 7–25)
CALCIUM SPEC-SCNC: 8.6 MG/DL (ref 8.6–10.5)
CHLORIDE SERPL-SCNC: 105 MMOL/L (ref 98–107)
CK SERPL-CCNC: 30 U/L (ref 20–180)
CO2 SERPL-SCNC: 18 MMOL/L (ref 22–29)
CREAT SERPL-MCNC: 0.61 MG/DL (ref 0.57–1)
CRP SERPL-MCNC: 15.9 MG/DL (ref 0–0.5)
DEPRECATED RDW RBC AUTO: 45.2 FL (ref 37–54)
EGFRCR SERPLBLD CKD-EPI 2021: 109.8 ML/MIN/1.73
EOSINOPHIL # BLD AUTO: 0.17 10*3/MM3 (ref 0–0.4)
EOSINOPHIL NFR BLD AUTO: 1.8 % (ref 0.3–6.2)
ERYTHROCYTE [DISTWIDTH] IN BLOOD BY AUTOMATED COUNT: 14.8 % (ref 12.3–15.4)
GLOBULIN UR ELPH-MCNC: 3.5 GM/DL
GLUCOSE SERPL-MCNC: 68 MG/DL (ref 65–99)
HCT VFR BLD AUTO: 28.6 % (ref 34–46.6)
HGB BLD-MCNC: 8.7 G/DL (ref 12–15.9)
IMM GRANULOCYTES # BLD AUTO: 0.04 10*3/MM3 (ref 0–0.05)
IMM GRANULOCYTES NFR BLD AUTO: 0.4 % (ref 0–0.5)
LYMPHOCYTES # BLD AUTO: 1.41 10*3/MM3 (ref 0.7–3.1)
LYMPHOCYTES NFR BLD AUTO: 14.6 % (ref 19.6–45.3)
MCH RBC QN AUTO: 25.4 PG (ref 26.6–33)
MCHC RBC AUTO-ENTMCNC: 30.4 G/DL (ref 31.5–35.7)
MCV RBC AUTO: 83.4 FL (ref 79–97)
MONOCYTES # BLD AUTO: 0.82 10*3/MM3 (ref 0.1–0.9)
MONOCYTES NFR BLD AUTO: 8.5 % (ref 5–12)
NEUTROPHILS NFR BLD AUTO: 7.16 10*3/MM3 (ref 1.7–7)
NEUTROPHILS NFR BLD AUTO: 74.4 % (ref 42.7–76)
NRBC BLD AUTO-RTO: 0 /100 WBC (ref 0–0.2)
PLATELET # BLD AUTO: 534 10*3/MM3 (ref 140–450)
PMV BLD AUTO: 9 FL (ref 6–12)
POTASSIUM SERPL-SCNC: 3.6 MMOL/L (ref 3.5–5.2)
POTASSIUM SERPL-SCNC: 4.1 MMOL/L (ref 3.5–5.2)
PROT SERPL-MCNC: 6.3 G/DL (ref 6–8.5)
RBC # BLD AUTO: 3.43 10*6/MM3 (ref 3.77–5.28)
SODIUM SERPL-SCNC: 138 MMOL/L (ref 136–145)
WBC NRBC COR # BLD: 9.63 10*3/MM3 (ref 3.4–10.8)

## 2023-07-22 PROCEDURE — 99152 MOD SED SAME PHYS/QHP 5/>YRS: CPT

## 2023-07-22 PROCEDURE — 87186 SC STD MICRODIL/AGAR DIL: CPT | Performed by: SURGERY

## 2023-07-22 PROCEDURE — 87185 SC STD ENZYME DETCJ PER NZM: CPT | Performed by: SURGERY

## 2023-07-22 PROCEDURE — 87205 SMEAR GRAM STAIN: CPT | Performed by: SURGERY

## 2023-07-22 PROCEDURE — 87070 CULTURE OTHR SPECIMN AEROBIC: CPT | Performed by: SURGERY

## 2023-07-22 PROCEDURE — 75989 ABSCESS DRAINAGE UNDER X-RAY: CPT

## 2023-07-22 PROCEDURE — 87076 CULTURE ANAEROBE IDENT EACH: CPT | Performed by: SURGERY

## 2023-07-22 PROCEDURE — 87206 SMEAR FLUORESCENT/ACID STAI: CPT | Performed by: SURGERY

## 2023-07-22 PROCEDURE — 0 POTASSIUM CHLORIDE 10 MEQ/100ML SOLUTION: Performed by: INTERNAL MEDICINE

## 2023-07-22 PROCEDURE — 87116 MYCOBACTERIA CULTURE: CPT | Performed by: SURGERY

## 2023-07-22 PROCEDURE — 86140 C-REACTIVE PROTEIN: CPT | Performed by: INTERNAL MEDICINE

## 2023-07-22 PROCEDURE — 87077 CULTURE AEROBIC IDENTIFY: CPT | Performed by: SURGERY

## 2023-07-22 PROCEDURE — 84132 ASSAY OF SERUM POTASSIUM: CPT | Performed by: INTERNAL MEDICINE

## 2023-07-22 PROCEDURE — 0W9F30Z DRAINAGE OF ABDOMINAL WALL WITH DRAINAGE DEVICE, PERCUTANEOUS APPROACH: ICD-10-PCS | Performed by: RADIOLOGY

## 2023-07-22 PROCEDURE — 87075 CULTR BACTERIA EXCEPT BLOOD: CPT | Performed by: SURGERY

## 2023-07-22 PROCEDURE — 99152 MOD SED SAME PHYS/QHP 5/>YRS: CPT | Performed by: RADIOLOGY

## 2023-07-22 PROCEDURE — C1729 CATH, DRAINAGE: HCPCS

## 2023-07-22 PROCEDURE — 25010000002 PIPERACILLIN SOD-TAZOBACTAM PER 1 G: Performed by: NURSE PRACTITIONER

## 2023-07-22 PROCEDURE — 25010000002 DAPTOMYCIN PER 1 MG: Performed by: INTERNAL MEDICINE

## 2023-07-22 PROCEDURE — 82550 ASSAY OF CK (CPK): CPT | Performed by: INTERNAL MEDICINE

## 2023-07-22 PROCEDURE — 25010000002 FENTANYL CITRATE (PF) 50 MCG/ML SOLUTION: Performed by: RADIOLOGY

## 2023-07-22 PROCEDURE — 87102 FUNGUS ISOLATION CULTURE: CPT | Performed by: SURGERY

## 2023-07-22 PROCEDURE — 85025 COMPLETE CBC W/AUTO DIFF WBC: CPT | Performed by: INTERNAL MEDICINE

## 2023-07-22 PROCEDURE — 80053 COMPREHEN METABOLIC PANEL: CPT | Performed by: INTERNAL MEDICINE

## 2023-07-22 PROCEDURE — 49406 IMAGE CATH FLUID PERI/RETRO: CPT | Performed by: RADIOLOGY

## 2023-07-22 PROCEDURE — 25010000002 MIDAZOLAM PER 1 MG: Performed by: RADIOLOGY

## 2023-07-22 RX ORDER — POTASSIUM CHLORIDE 750 MG/1
20 CAPSULE, EXTENDED RELEASE ORAL ONCE
Status: COMPLETED | OUTPATIENT
Start: 2023-07-22 | End: 2023-07-22

## 2023-07-22 RX ORDER — FENTANYL CITRATE 50 UG/ML
INJECTION, SOLUTION INTRAMUSCULAR; INTRAVENOUS AS NEEDED
Status: COMPLETED | OUTPATIENT
Start: 2023-07-22 | End: 2023-07-22

## 2023-07-22 RX ORDER — FENTANYL CITRATE 50 UG/ML
INJECTION, SOLUTION INTRAMUSCULAR; INTRAVENOUS
Status: DISPENSED
Start: 2023-07-22 | End: 2023-07-22

## 2023-07-22 RX ORDER — LIDOCAINE HYDROCHLORIDE 10 MG/ML
10 INJECTION, SOLUTION EPIDURAL; INFILTRATION; INTRACAUDAL; PERINEURAL ONCE
Status: COMPLETED | OUTPATIENT
Start: 2023-07-22 | End: 2023-07-22

## 2023-07-22 RX ORDER — POTASSIUM CHLORIDE 7.45 MG/ML
10 INJECTION INTRAVENOUS
Status: DISCONTINUED | OUTPATIENT
Start: 2023-07-22 | End: 2023-07-22

## 2023-07-22 RX ORDER — POTASSIUM CHLORIDE 750 MG/1
40 CAPSULE, EXTENDED RELEASE ORAL ONCE
Status: COMPLETED | OUTPATIENT
Start: 2023-07-22 | End: 2023-07-22

## 2023-07-22 RX ORDER — MIDAZOLAM HYDROCHLORIDE 1 MG/ML
INJECTION INTRAMUSCULAR; INTRAVENOUS
Status: DISPENSED
Start: 2023-07-22 | End: 2023-07-22

## 2023-07-22 RX ORDER — MIDAZOLAM HYDROCHLORIDE 1 MG/ML
INJECTION INTRAMUSCULAR; INTRAVENOUS AS NEEDED
Status: COMPLETED | OUTPATIENT
Start: 2023-07-22 | End: 2023-07-22

## 2023-07-22 RX ADMIN — POTASSIUM CHLORIDE 40 MEQ: 750 CAPSULE, EXTENDED RELEASE ORAL at 16:05

## 2023-07-22 RX ADMIN — PIPERACILLIN SODIUM AND TAZOBACTAM SODIUM 3.38 G: 3; .375 INJECTION, SOLUTION INTRAVENOUS at 02:08

## 2023-07-22 RX ADMIN — POTASSIUM CHLORIDE 20 MEQ: 750 CAPSULE, EXTENDED RELEASE ORAL at 12:40

## 2023-07-22 RX ADMIN — POTASSIUM CHLORIDE 10 MEQ: 7.46 INJECTION, SOLUTION INTRAVENOUS at 08:31

## 2023-07-22 RX ADMIN — LIDOCAINE HYDROCHLORIDE 10 ML: 10 INJECTION, SOLUTION EPIDURAL; INFILTRATION; INTRACAUDAL; PERINEURAL at 11:58

## 2023-07-22 RX ADMIN — DAPTOMYCIN 300 MG: 500 INJECTION, POWDER, LYOPHILIZED, FOR SOLUTION INTRAVENOUS at 12:40

## 2023-07-22 RX ADMIN — PIPERACILLIN SODIUM AND TAZOBACTAM SODIUM 3.38 G: 3; .375 INJECTION, SOLUTION INTRAVENOUS at 09:47

## 2023-07-22 RX ADMIN — MIDAZOLAM HYDROCHLORIDE 1 MG: 1 INJECTION, SOLUTION INTRAMUSCULAR; INTRAVENOUS at 11:33

## 2023-07-22 RX ADMIN — FENTANYL CITRATE 50 MCG: 50 INJECTION, SOLUTION INTRAMUSCULAR; INTRAVENOUS at 11:33

## 2023-07-22 RX ADMIN — PIPERACILLIN SODIUM AND TAZOBACTAM SODIUM 3.38 G: 3; .375 INJECTION, SOLUTION INTRAVENOUS at 17:41

## 2023-07-22 NOTE — NURSING NOTE
CT guided abscess drain placed by Dr Engel. 12 Turkish Locking drain to bulb suction placed; with 10 mls purulent fluid removed. Labs obtained. Sedation time of 10 minutes, 1 mg Versed and 50 mcg Fentanyl given. Patient tolerated well. Report called to Laure. SHRAVAN-[id number]

## 2023-07-22 NOTE — PROGRESS NOTES
[facility]    INFECTIOUS DISEASE PROGRESS NOTE    Patricia Castillo  1975  [id number]    Consult: 7/21/23    Admit date: 7/20/2023    Requesting Provider: Sendy Rios DO  Evaluating physician: James Jansen MD  Reason for Consultation: Abdominal wall abscess  Chief Complaint: Above      Subjective   History of present illness:  Patient is a  47 y.o.  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  Developed purulent drainage and was evaluated by primary care and placed on Bactrim.  Then admitted to [facility] where CT scan revealed findings consistent with abdominal wall abscess.  The patient was transferred and admitted to [facility] on 7/20/2023.  I was consulted on 7/21/2023.  The patient has no other localizing signs or symptoms of infection.  She denies ill contacts, TB, HIV, zoonotic exposures.    7/22/2023 history reviewed.  Underwent CT-guided drainage of abdominal wall abscess on 7/22 by Pawan Engel MD.  No high fevers or chills.  Tolerating daptomycin and piperacillin/tazobactam, duration to be determined.  Blood cultures x2 negative.  Cultures and cytology from purulent fluid from abdominal wall pending.    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    Cancer 8-2013    Appendiceal Cancer    Headache 1985    Heart murmur 1988       Past Surgical History:   Procedure Laterality Date    APPENDECTOMY  8-5-2013    Appendix removed cancer found    CHOLECYSTECTOMY  10-1-2013    Removed during cancer debulking surgery    COLON SURGERY  10-1-2014    Partial removal during cancer debulking surgery    COLONOSCOPY  9-30-13     HYSTERECTOMY  8-5-2013    Full hysterectomy    OOPHORECTOMY      SMALL INTESTINE SURGERY  10-1-2013    Partial removal during cancer debulking & HIPEC    TONSILLECTOMY  1993       Pediatric History   Patient Parents    Not on file     Other Topics Concern    Not on file   Social History Narrative    Not on file   Previous smoking history but negative since 29 years ago.  No alcohol or drug use.  .  2 grown children.  Originally from [place],  works from home on computer.    family history includes Alcohol abuse in her paternal aunt and sister; Anxiety disorder in her sister; Cancer in her paternal grandfather; Hyperlipidemia in her mother; Other in her maternal grandmother and mother.    Allergies   Allergen Reactions    Chloraseptic Sore Throat [Acetaminophen] Hives    Phenol Hives and Rash       Immunization History   Administered Date(s) Administered    COVID-19 (MODERNA) 1st,2nd,3rd Dose Monovalent 03/09/2021, 04/07/2021, 11/07/2021    COVID-19 (PFIZER) BIVALENT 12+YRS 09/30/2022    FluLaval/Fluzone >6mos 09/21/2018, 10/15/2020, 10/03/2021, 09/30/2022    Meningococcal B,(Bexsero) 10/29/2020, 11/29/2020    Meningococcal MCV4P (Menactra) 10/30/2020    Pneumococcal Conjugate 13-Valent (PCV13) 10/30/2020    Pneumococcal Polysaccharide (PPSV23) 12/26/2020    Tdap 12/06/2022       Medication:    Current Facility-Administered Medications:     aspirin-acetaminophen-caffeine (EXCEDRIN MIGRAINE) 250-250-65 MG per tablet 1 tablet, 1 tablet, Oral, Q6H PRN, Uma, Eliza, APRN, 1 tablet at 07/21/23 0347    sennosides-docusate (PERICOLACE) 8.6-50 MG per tablet 2 tablet, 2 tablet, Oral, BID **AND** polyethylene glycol (MIRALAX) packet 17 g, 17 g, Oral, Daily PRN **AND** bisacodyl (DULCOLAX) EC tablet 5 mg, 5 mg, Oral, Daily PRN **AND** bisacodyl (DULCOLAX) suppository 10 mg, 10 mg, Rectal, Daily PRN, Uma, Eliza, APRN    Calcium Replacement - Follow Nurse / BPA Driven Protocol, , Does not apply, PRN,  Uma, Eliza, APRN    DAPTOmycin (CUBICIN) 300 mg in sodium chloride 0.9 % 50 mL IVPB, 6 mg/kg, Intravenous, Q24H, James Jansen MD, Last Rate: 100 mL/hr at 07/21/23 1339, 300 mg at 07/21/23 1339    HYDROcodone-acetaminophen (NORCO) 5-325 MG per tablet 1 tablet, 1 tablet, Oral, Q4H PRN, Uma, Eliza, APRN, 1 tablet at 07/21/23 0936    lactobacillus acidophilus (RISAQUAD) capsule 1 capsule, 1 capsule, Oral, Daily, Uma, Eliza, APRN, 1 capsule at 07/21/23 0936    Magnesium Standard Dose Replacement - Follow Nurse / BPA Driven Protocol, , Does not apply, PRN, Uma, Eliza, APRN    multivitamin with minerals 1 tablet, 1 tablet, Oral, Daily, Uma, Eliza, APRN, 1 tablet at 07/21/23 0936    Phosphorus Replacement - Follow Nurse / BPA Driven Protocol, , Does not apply, PRN, Uma, Eliza, APRN    piperacillin-tazobactam (ZOSYN) 3.375 g in iso-osmotic dextrose 50 ml (premix), 3.375 g, Intravenous, Q8H, Uma, Eliza, APRN, 3.375 g at 07/22/23 0947    potassium chloride (MICRO-K) CR capsule 20 mEq, 20 mEq, Oral, Once **FOLLOWED BY** potassium chloride (MICRO-K) CR capsule 40 mEq, 40 mEq, Oral, Once, Anthony Melara MD    Potassium Replacement - Follow Nurse / BPA Driven Protocol, , Does not apply, PRN, Uma, Eliza, APRN    Please refer to the medical record for a full medication list    Review of Systems:    Constitutional--low-grade fever, no chills or sweats.  Appetite good, and no malaise. No fatigue.  HEENT-- No new vision, hearing or throat complaints.  No epistaxis or oral sores.  Denies odynophagia or dysphagia.  No odynophagia or dysphagia. No headache, photophobia or neck stiffness.  CV-- No chest pain, palpitation or syncope  Resp-- No SOB/cough/Hemoptysis  GI- No nausea, vomiting, or diarrhea.  No hematochezia, melena, or hematemesis. Denies jaundice or chronic liver disease.  Abdominal wall pain as per HPI  -- No dysuria, hematuria, or flank pain.  Denies hesitancy,  urgency or flank pain.  Lymph- no swollen lymph nodes in neck/axilla or groin.   Heme- No active bruising or bleeding; no Hx of DVT or PE.  MS-- no swelling or pain in the bones or joints of arms/legs.  No new back pain.  Neuro-- No acute focal weakness or numbness in the arms or legs.  No seizures.  Skin--No rashes or lesions, except abdominal wall as per HPI    Physical Exam:   Vital Signs   Temp:  [98 °F (36.7 °C)-99.5 °F (37.5 °C)] 98.3 °F (36.8 °C)  Heart Rate:  [80-96] 88  Resp:  [14-18] 18  BP: ()/(56-78) 95/68    Temp  Min: 98 °F (36.7 °C)  Max: 99.5 °F (37.5 °C)  BP  Min: 90/60  Max: 111/71  Pulse  Min: 80  Max: 96  Resp  Min: 14  Max: 18  SpO2  Min: 94 %  Max: 100 %    Blood pressure 95/68, pulse 88, temperature 98.3 °F (36.8 °C), temperature source Oral, resp. rate 18, SpO2 100 %.  GENERAL: Awake and alert, in moderate distress. Appears older than stated age.  HEENT:  Normocephalic, atraumatic.  Oropharynx without thrush. Dentition in good repair. No cervical adenopathy. No neck masses.  Ears externally normal, Nose externally normal.  EYES: No conjunctival injection. No icterus. EOM full.  LYMPHATICS: No lymphadenopathy of the neck or axillary or inguinal regions.   HEART: No murmur, gallop, or pericardial friction rub. Reg rate rhythm, No JVD at 45 degrees.  LUNGS: Clear to auscultation and percussion. No respiratory distress, no use of accessory muscles.  ABDOMEN: Soft, tender to deep palpation along previous surgical site without crepitus or bullae, with some purulent discharge, nondistended. No appreciable HSM.  Bowel sounds normal.  SKIN: Warm and dry without cutaneous eruptions.  No nodules.  Area under right breast old track small wound, no pus or erythema.  Abd wall below umbilicus about 7 cm long with erythema, induration, no crepitus, fluctuance.  Increased heat.  PSYCHIATRIC: Mental status lucid. No confusion.  EXT:  No cellulitic change.  NEURO: Oriented to name,  nonfocal      7/21/23    Results Review:   I reviewed the patient's new clinical results.  I reviewed the patient's new imaging results and agree with the interpretation.  I reviewed the patient's other test results and agree with the interpretation    Results from last 7 days   Lab Units 07/22/23  0506 07/21/23  0302 07/21/23  0006   WBC 10*3/mm3 9.63 10.45 8.83   HEMOGLOBIN g/dL 8.7* 8.6* 9.3*   HEMATOCRIT % 28.6* 27.7* 29.1*   PLATELETS 10*3/mm3 534* 544* 533*       Results from last 7 days   Lab Units 07/22/23  0506   SODIUM mmol/L 138   POTASSIUM mmol/L 3.6   CHLORIDE mmol/L 105   CO2 mmol/L 18.0*   BUN mg/dL 14   CREATININE mg/dL 0.61   GLUCOSE mg/dL 68   CALCIUM mg/dL 8.6       Results from last 7 days   Lab Units 07/22/23  0506   ALK PHOS U/L 154*   BILIRUBIN mg/dL <0.2   ALT (SGPT) U/L 26   AST (SGOT) U/L 26           Results from last 7 days   Lab Units 07/22/23  0506   CRP mg/dL 15.90*           Results from last 7 days   Lab Units 07/21/23  0006   LACTATE mmol/L 1.0       Estimated Creatinine Clearance: 94.2 mL/min (by C-G formula based on SCr of 0.61 mg/dL).  CPK          7/22/2023    05:06   Common Labsle   Creatine Kinase 30       Procalitonin Results:      Lab 07/21/23  0006 07/20/23  1542   PROCALCITONIN 0.14 0.16        Brief Urine Lab Results       None           No results found for: SITE, ALLENTEST, PHART, BCT3JRF, PO2ART, BBO1XNX, BASEEXCESS, G1JWPWLT, HGBBG, HCTABG, OXYHEMOGLOBI, METHHGBN, CARBOXYHGB, CO2CT, BAROMETRIC, MODALITY, FIO2     Microbiology:  No results found for: ACANTHNAEG, AFBCX, BPERTUSSISCX, BLOODCX  No results found for: BCIDPCR, CXREFLEX, CSFCX, CULTURETIS  No results found for: CULTURES, HSVCX, URCX  No results found for: EYECULTURE, GCCX, HSVCULTURE, LABHSV  No results found for: LEGIONELLA, MRSACX, MUMPSCX, MYCOPLASCX  No results found for: NOCARDIACX, STOOLCX  No results found for: THROATCX, UNSTIMCULT, URINECX, CULTURE, VZVCULTUR  No results found for: VIRALCULTU,  WOUNDCX     No results found for: TISSCXQ, CULTURES, CULTURE, BLOODCX, ANACX, BALCX, ACIDFASTCX, BODYFLDCX, CXREFLEX, FUNGUSCX, RESPCX, MRSACX, ROUTCX, BRCHWSHCLT, TISSUECX, URINECX, CMVCX, WOUNDCX, BCIDPCR, BFCULTURE, BLOODCULT(    Abdominal wall cultures 7/22/2023 for AFB, fungal, routine culture pending.    Radiology:  Imaging Results (Last 72 Hours)       Procedure Component Value Units Date/Time    CT Guided Abscess Drain Pelvis [[id number]] Resulted: 07/22/23 1153     Updated: 07/22/23 1158            IMPRESSION:     1.  Abdominal wall abscess with 6.5 x 3.5 cm air-fluid collection seen on CT scan at [facility] on 7/20/2023.  Usual organisms are staphylococci versus potentially bowel vernon given the history of previous surgeries for intra-abdominal pathology.  The abdominal wall abscess may also be related to metastatic cancer to the abdominal wall.  Previous biologic mesh used 2021 per report in abdominal surgery per patient.  Doubt related.  Status post CT-guided drainage 7/22/2023.  2.  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  Related to previous metastatic adenocarcinoma.  3.  History of appendiceal carcinoma status post debulking procedures x3 at [facility]  Data deficit.  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  Surgery October 2013, November 2015, January 2021 with intraperitoneal cisplatin.  Previous CT scan at [facility] in February 2023 with noted increase in mucinous peritoneal tumor implants adjacent to gastric fundus, right lower quadrant, supraumbilical, right pelvis, left pelvic implant.  Previous right hemicolectomy, cholecystectomy, splenectomy, omentectomy, hysterectomy, and bilateral salpingo-oophorectomy.  Previous chronic right upper quadrant wound was managed by local wound care per the [facility] note February 2023.  Edgard Frances MD.  4.   Anemia, chronic disease.  5.  Elevated alkaline phosphatase 155, CT scan of the abdomen and pelvis without obvious liver pathology.    Plan:    1.  Diagnostically, continue to follow patient's physical exam, CBC, CMP, CRP, blood cultures x2, follow abdominal wall culture from 7/22 aspiration for routine C&S, AFB, and fungus, as well as cytology.  Follow CPK weekly while on IV daptomycin.  CPK 7/22 was 30.  2.  Therapeutically, continue daptomycin and piperacillin/tazobactam pending further culture data.  Duration of therapy to be determined.  Likely to be weeks.  Consider change to daptomycin and ertapenem for ease of outpatient administration.  3.  Supportive care.  CT-guided drainage 7/22.  Also d/w [facility].    I discussed the patient's findings and my recommendations with patient and nursing staff    Case management orders: Please arrange for outpatient IV antibiotics with [facility] with daptomycin 300 mg IV daily, ertapenem 1 g IV daily until 8/22/2023 for abdominal wall abscess, status post drainage 7/22.  Check CBC, CMP, CRP, CPK weekly while on IV antibiotics.  Fax orders to [phone], call [phone] with final arrangements.  Arrange for follow-up with me in 1 week postdischarge.  Weekly PICC dressing changes.    Thank you for asking me to see Patricia Castillo.  Our group would be pleased to follow this patient over the course of their hospitalization and assist with outpatient antimicrobial therapy, as indicated.  Further recommendations depend on the results of the cultures and clinical course.  Increased risk for adverse drug reactions, complications of IV access, readmission.  See next on Monday, call sooner if needed.  D/w patients , and Dr. Melara.    James Jansen MD  7/22/2023

## 2023-07-22 NOTE — PROGRESS NOTES
General Surgery Daily Progress Note    Subjective:  No new complaints.  Still with pain.    Objective:  /70 (BP Location: Right arm, Patient Position: Sitting)   Pulse 96   Temp 98.3 °F (36.8 °C) (Oral)   Resp 18   SpO2 94%       General Appearance: Mild distress  Eyes: Anicteric  Neck: Trachea midline   Cardiovascular:  RRR without murmur nor rub  Lungs:  Bilateral respirations unlabored   Abdomen:  Soft, tender, worsening erythema, no drain  Extremities:  No cyanosis or edema   Skin:  No obvious rashes   Neurologic: awake and conversant     CBC:  Results from last 7 days   Lab Units 07/22/23  0506   WBC 10*3/mm3 9.63   HEMOGLOBIN g/dL 8.7*   HEMATOCRIT % 28.6*   PLATELETS 10*3/mm3 534*       CMP:  Results from last 7 days   Lab Units 07/22/23  0506   SODIUM mmol/L 138   POTASSIUM mmol/L 3.6   CHLORIDE mmol/L 105   CO2 mmol/L 18.0*   BUN mg/dL 14   CREATININE mg/dL 0.61   CALCIUM mg/dL 8.6   BILIRUBIN mg/dL <0.2   ALK PHOS U/L 154*   ALT (SGPT) U/L 26   AST (SGOT) U/L 26   GLUCOSE mg/dL 68         Assessment:  Abdominal wall abscess  History of appendiceal cancer (care at [facility])    Plan:   Abdominal wall abscess.  Drain not placed yesterday secondary to patient wishing to wait for input from [facility].  May defer to them.    Inocenet Bowden MD - 7/22/2023, 09:31 EDT

## 2023-07-22 NOTE — CASE MANAGEMENT/SOCIAL WORK
Continued Stay Note  [facility]     Patient Name: Patricia Castillo  MRN: [id number]  Today's Date: 7/22/2023    Admit Date: 7/20/2023    Plan: Home   Discharge Plan       Row Name 07/22/23 1511       Plan    Plan Home    Patient/Family in Agreement with Plan yes    Plan Comments I received consult to arrange Outpat IV antibiotics w/[facility]. I met w/pt and spouse in room. Pt awaiting PICC line placement, no plans for discharge today. I will follow pt tomorrow and if d/c planned will call [facility] on call RN to arrange. Pt and spouse have had past experience w/PICC line antibiotics. Plan is home @ d/c, spouse will transport.    Final Discharge Disposition Code 01 - home or self-care                   Discharge Codes    No documentation.                 Expected Discharge Date and Time       Expected Discharge Date Expected Discharge Time    Jul 28, 2023               Yeyo Nina RN

## 2023-07-22 NOTE — PROCEDURES
Utilizing CT guidance, a 12 Spanish locking pigtail drainage catheter was placed into the periumbilical subcutaneous/abdominal wall abscess.  Thick mucinous/purulent fluid was encountered.  Specimens were sent for culture/labs/cytology, per the referring service.  There were no immediate complications.  Full report to follow.

## 2023-07-22 NOTE — PROGRESS NOTES
[facility] Medicine Services  PROGRESS NOTE    Patient Name: Patricia Castillo  : 1975  MRN: [id number]    Date of Admission: 2023  Primary Care Physician: Orly Orta APRN    Subjective   Subjective     CC:  Abdominal wall abscess    HPI:  Still some mild abdominal discomfort, tracking toward the left.  Unaware of any fever overnight.    ROS:  Gen: no fever  Pulm: no cough  GI; no nausea, abdominal discomfort as above. + loose stool    Objective   Objective     Vital Signs:   Temp:  [98 °F (36.7 °C)-99.5 °F (37.5 °C)] 98.3 °F (36.8 °C)  Heart Rate:  [80-96] 96  Resp:  [14-18] 18  BP: (101-111)/(62-78) 106/70     Physical Exam:  Constitutional - no acute distress, nontoxic, up in chair  HEENT-NCAT, mucous membranes moist  CV-RRR  Resp-CTAB  Abd-soft, mild lower abd tenderness, mild to moderate erythema mid/lower abdomen, largely unchanged from yesterday  Ext-No lower extremity cyanosis, clubbing or edema bilaterally  Neuro-alert, speech clear, moves all extremities   Psych-normal affect   Skin- abdominal erythema as above        Results Reviewed:  LAB RESULTS:      Lab 23  0506 23  1401 23  0916 23  0302 23  0006 23  1542   WBC 9.63  --   --  10.45 8.83 11.29*   HEMOGLOBIN 8.7*  --   --  8.6* 9.3* 10.3*   HEMATOCRIT 28.6*  --   --  27.7* 29.1* 32.3*   PLATELETS 534*  --   --  544* 533* 605*   NEUTROS ABS 7.16*  --   --  7.75* 6.50 8.88*   IMMATURE GRANS (ABS) 0.04  --   --  0.03 0.03 0.04   LYMPHS ABS 1.41  --   --  1.46 1.39 1.25   MONOS ABS 0.82  --   --  1.17* 0.86 1.06*   EOS ABS 0.17  --   --  0.02 0.03 0.04   MCV 83.4  --   --  82.2 82.7 83.0   CRP 15.90*  --  17.88*  --   --   --    PROCALCITONIN  --   --   --   --  0.14 0.16   LACTATE  --   --   --   --  1.0 0.9   PROTIME  --  14.8*  --   --   --   --          Lab 23  0506 23  0302 23  0006 23  1542   SODIUM 138 139 140 139   POTASSIUM 3.6 4.0 3.7  3.8   CHLORIDE 105 105 106 101   CO2 18.0* 21.0* 20.0* 21.8*   ANION GAP 15.0 13.0 14.0 16.2*   BUN 14 13 13 14   CREATININE 0.61 0.67 0.70 0.70   EGFR 109.8 107.3 106.2 106.2   GLUCOSE 68 158* 102* 87   CALCIUM 8.6 8.4* 8.5* 9.3         Lab 07/22/23  0506 07/21/23  0302 07/21/23  0006 07/20/23  1542   TOTAL PROTEIN 6.3 5.7* 6.5 7.4   ALBUMIN 2.8* 3.0* 3.1* 3.7   GLOBULIN 3.5 2.7 3.4 3.7   ALT (SGPT) 26 26 29 29   AST (SGOT) 26 26 29 34*   BILIRUBIN <0.2 <0.2 <0.2 0.2   ALK PHOS 154* 155* 161* 159*         Lab 07/21/23  1401   PROTIME 14.8*   INR 1.15*             Lab 07/21/23  0748 07/21/23  0302 07/21/23  0302 07/21/23  0006   IRON  --   --   --  7*  7*   IRON SATURATION (TSAT)  --   --   --  3*   TIBC  --   --   --  271*   TRANSFERRIN  --   --   --  182*   FERRITIN  --   --   --  90.24   FOLATE  --   --  17.30  --    VITAMIN B 12  --   --  553  --    ABO TYPING A   < > A  --    RH TYPING Positive   < > Positive  --    ANTIBODY SCREEN  --   --  Negative  --     < > = values in this interval not displayed.         Brief Urine Lab Results       None            Microbiology Results Abnormal       Procedure Component Value - Date/Time    Blood Culture - Blood, Arm, Right [[id number]]  (Normal) Collected: 07/21/23 0000    Lab Status: Preliminary result Specimen: Blood from Arm, Right Updated: 07/22/23 0030     Blood Culture No growth at 24 hours    Blood Culture - Blood, Hand, Right [[id number]]  (Normal) Collected: 07/21/23 0010    Lab Status: Preliminary result Specimen: Blood from Hand, Right Updated: 07/22/23 0030     Blood Culture No growth at 24 hours            CT Abdomen Pelvis With Contrast    Result Date: 7/20/2023  FINAL REPORT TECHNIQUE: Routine axial images through the abdomen and pelvis were obtained following IV contrast administration. CLINICAL HISTORY: abdominal wall pain, induration and erythema, hx of appendiceal cancer FINDINGS: Abdomen: The gallbladder is not visualized.  The solid abdominal organs  and ureters are unremarkable.  Multiloculated fluid collections are seen in the anterior peritoneum adjacent to the liver worrisome for pseudomyxoma peritonei.  Similar small collections are seen on the left side of the anterior abdomen.  Within the midline anterior bone wall is a 6.5 x 3.5 cm air and fluid collection consistent with abscess.  The distal colon is dilated with air and fluid.  There are some loops of dilated air and fluid-filled small bowel in the abdomen and pelvis. Pelvis:Probable loculated fluid collections are seen in the pelvis . The urinary bladder is not well visualized.  The uterus and ovaries are not visualized  There is no pelvic or abdominal ascites, adenopathy or acute osseous abnormality.     Impression: Abscess within the midline anterior abdominal wall.  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  Dilation within the colon and loops of small bowel of uncertain etiology, partial bowel obstruction cannot be excluded. Authenticated and Electronically Signed by Jose D Jay M.D. on 07/20/2023 07:03:55 PM         Current medications:  Scheduled Meds:DAPTOmycin, 6 mg/kg, Intravenous, Q24H  lactobacillus acidophilus, 1 capsule, Oral, Daily  multivitamin with minerals, 1 tablet, Oral, Daily  piperacillin-tazobactam, 3.375 g, Intravenous, Q8H  potassium chloride, 10 mEq, Intravenous, Q1H  senna-docusate sodium, 2 tablet, Oral, BID      Continuous Infusions:     PRN Meds:.  aspirin-acetaminophen-caffeine    senna-docusate sodium **AND** polyethylene glycol **AND** bisacodyl **AND** bisacodyl    Calcium Replacement - Follow Nurse / BPA Driven Protocol    HYDROcodone-acetaminophen    Magnesium Standard Dose Replacement - Follow Nurse / BPA Driven Protocol    Phosphorus Replacement - Follow Nurse / BPA Driven Protocol    Potassium Replacement - Follow Nurse / BPA Driven Protocol    Assessment & Plan   Assessment & Plan     Active Hospital Problems     Diagnosis  POA    **Abscess [L02.91]  Yes    Abdominal wall abscess [L02.211]  Yes    History of cancer [Z85.9]  Not Applicable    Normocytic anemia [D64.9]  Yes      Resolved Hospital Problems   No resolved problems to display.        Brief Hospital Course to date:  Patricia Castillo is a 47 y.o. female with history of appendiceal cancer s/p debulking x3 presents with abdominal pain, redness and fever    Abdominal wall abscess  - small open lesion under right breast -- however difficult to appreciate tracking to the abdominal abscess on CT  - empiric antibiotics  - patient discussed abscess drainage with her physician at [facility] yesterday, and is agreeable to percutaneous drainage today.  Discussed with Wilfredo Engel and Dennise.    Appendiceal cancer  - s/p debulking x 3 with recurrence    Anemia  - hemoglobin 8.7  - iron sat 3%, mcv 82 -- will need iron, IV vs oral    Thrombocytosis  - likely secondary to infection/inflammatory state      Expected Discharge Location and Transportation:   Expected Discharge   Expected Discharge Date: 7/28/2023; Expected Discharge Time:      DVT prophylaxis:  likely add subq heparin when procedures completed  Mechanical DVT prophylaxis orders are present.          CODE STATUS:   Code Status and Medical Interventions:   Ordered at: 07/21/23 0111     Level Of Support Discussed With:    Patient     Code Status (Patient has no pulse and is not breathing):    CPR (Attempt to Resuscitate)     Medical Interventions (Patient has pulse or is breathing):    Full Support     Release to patient:    Routine Release       Anthony Melara MD  07/22/23

## 2023-07-23 LAB
ALBUMIN SERPL-MCNC: 3.2 G/DL (ref 3.5–5.2)
ALBUMIN/GLOB SERPL: 1 G/DL
ALP SERPL-CCNC: 165 U/L (ref 39–117)
ALT SERPL W P-5'-P-CCNC: 23 U/L (ref 1–33)
ANION GAP SERPL CALCULATED.3IONS-SCNC: 12 MMOL/L (ref 5–15)
AST SERPL-CCNC: 22 U/L (ref 1–32)
BASOPHILS # BLD AUTO: 0.03 10*3/MM3 (ref 0–0.2)
BASOPHILS NFR BLD AUTO: 0.4 % (ref 0–1.5)
BILIRUB SERPL-MCNC: <0.2 MG/DL (ref 0–1.2)
BUN SERPL-MCNC: 17 MG/DL (ref 6–20)
BUN/CREAT SERPL: 28.8 (ref 7–25)
CALCIUM SPEC-SCNC: 9 MG/DL (ref 8.6–10.5)
CHLORIDE SERPL-SCNC: 108 MMOL/L (ref 98–107)
CO2 SERPL-SCNC: 21 MMOL/L (ref 22–29)
CREAT SERPL-MCNC: 0.59 MG/DL (ref 0.57–1)
DEPRECATED RDW RBC AUTO: 44.1 FL (ref 37–54)
EGFRCR SERPLBLD CKD-EPI 2021: 110.6 ML/MIN/1.73
EOSINOPHIL # BLD AUTO: 0.12 10*3/MM3 (ref 0–0.4)
EOSINOPHIL NFR BLD AUTO: 1.5 % (ref 0.3–6.2)
ERYTHROCYTE [DISTWIDTH] IN BLOOD BY AUTOMATED COUNT: 14.9 % (ref 12.3–15.4)
GLOBULIN UR ELPH-MCNC: 3.3 GM/DL
GLUCOSE SERPL-MCNC: 108 MG/DL (ref 65–99)
HCT VFR BLD AUTO: 27.4 % (ref 34–46.6)
HGB BLD-MCNC: 8.6 G/DL (ref 12–15.9)
IMM GRANULOCYTES # BLD AUTO: 0.03 10*3/MM3 (ref 0–0.05)
IMM GRANULOCYTES NFR BLD AUTO: 0.4 % (ref 0–0.5)
LYMPHOCYTES # BLD AUTO: 1.59 10*3/MM3 (ref 0.7–3.1)
LYMPHOCYTES NFR BLD AUTO: 19.9 % (ref 19.6–45.3)
MCH RBC QN AUTO: 25.5 PG (ref 26.6–33)
MCHC RBC AUTO-ENTMCNC: 31.4 G/DL (ref 31.5–35.7)
MCV RBC AUTO: 81.3 FL (ref 79–97)
MONOCYTES # BLD AUTO: 0.7 10*3/MM3 (ref 0.1–0.9)
MONOCYTES NFR BLD AUTO: 8.7 % (ref 5–12)
NEUTROPHILS NFR BLD AUTO: 5.54 10*3/MM3 (ref 1.7–7)
NEUTROPHILS NFR BLD AUTO: 69.1 % (ref 42.7–76)
NRBC BLD AUTO-RTO: 0 /100 WBC (ref 0–0.2)
PLATELET # BLD AUTO: 623 10*3/MM3 (ref 140–450)
PMV BLD AUTO: 9 FL (ref 6–12)
POTASSIUM SERPL-SCNC: 4.3 MMOL/L (ref 3.5–5.2)
PROT SERPL-MCNC: 6.5 G/DL (ref 6–8.5)
RBC # BLD AUTO: 3.37 10*6/MM3 (ref 3.77–5.28)
SODIUM SERPL-SCNC: 141 MMOL/L (ref 136–145)
WBC NRBC COR # BLD: 8.01 10*3/MM3 (ref 3.4–10.8)

## 2023-07-23 PROCEDURE — 02HV33Z INSERTION OF INFUSION DEVICE INTO SUPERIOR VENA CAVA, PERCUTANEOUS APPROACH: ICD-10-PCS | Performed by: INTERNAL MEDICINE

## 2023-07-23 PROCEDURE — C1751 CATH, INF, PER/CENT/MIDLINE: HCPCS

## 2023-07-23 PROCEDURE — 85025 COMPLETE CBC W/AUTO DIFF WBC: CPT | Performed by: INTERNAL MEDICINE

## 2023-07-23 PROCEDURE — C1894 INTRO/SHEATH, NON-LASER: HCPCS

## 2023-07-23 PROCEDURE — 80053 COMPREHEN METABOLIC PANEL: CPT | Performed by: INTERNAL MEDICINE

## 2023-07-23 PROCEDURE — 25010000002 DAPTOMYCIN PER 1 MG: Performed by: INTERNAL MEDICINE

## 2023-07-23 PROCEDURE — 25010000002 PIPERACILLIN SOD-TAZOBACTAM PER 1 G: Performed by: NURSE PRACTITIONER

## 2023-07-23 RX ORDER — SODIUM CHLORIDE 0.9 % (FLUSH) 0.9 %
10 SYRINGE (ML) INJECTION AS NEEDED
Status: DISCONTINUED | OUTPATIENT
Start: 2023-07-23 | End: 2023-07-24 | Stop reason: HOSPADM

## 2023-07-23 RX ORDER — SODIUM CHLORIDE 0.9 % (FLUSH) 0.9 %
10 SYRINGE (ML) INJECTION EVERY 12 HOURS SCHEDULED
Status: DISCONTINUED | OUTPATIENT
Start: 2023-07-23 | End: 2023-07-24 | Stop reason: HOSPADM

## 2023-07-23 RX ADMIN — PIPERACILLIN SODIUM AND TAZOBACTAM SODIUM 3.38 G: 3; .375 INJECTION, SOLUTION INTRAVENOUS at 09:02

## 2023-07-23 RX ADMIN — Medication 10 ML: at 11:03

## 2023-07-23 RX ADMIN — Medication 1 CAPSULE: at 08:56

## 2023-07-23 RX ADMIN — MULTIPLE VITAMINS W/ MINERALS TAB 1 TABLET: TAB at 08:56

## 2023-07-23 RX ADMIN — PIPERACILLIN SODIUM AND TAZOBACTAM SODIUM 3.38 G: 3; .375 INJECTION, SOLUTION INTRAVENOUS at 02:28

## 2023-07-23 RX ADMIN — Medication 10 ML: at 20:45

## 2023-07-23 RX ADMIN — PIPERACILLIN SODIUM AND TAZOBACTAM SODIUM 3.38 G: 3; .375 INJECTION, SOLUTION INTRAVENOUS at 17:39

## 2023-07-23 RX ADMIN — DAPTOMYCIN 300 MG: 500 INJECTION, POWDER, LYOPHILIZED, FOR SOLUTION INTRAVENOUS at 11:32

## 2023-07-23 NOTE — PROGRESS NOTES
[facility] Medicine Services  PROGRESS NOTE    Patient Name: Patricia Castillo  : 1975  MRN: [id number]    Date of Admission: 2023  Primary Care Physician: Orly Orta APRN    Subjective   Subjective     CC:  Abdominal wall abscess    HPI:  No fever. Abdominal pain improved after drain placement.  Drain emptied once today already. Feels like erythema on stomach is slightly better today compared to yesterday.    ROS:  Gen: no fever  Pulm: no cough  GI: no abdominal pain today    Objective   Objective     Vital Signs:   Temp:  [97.8 °F (36.6 °C)-99 °F (37.2 °C)] 97.9 °F (36.6 °C)  Heart Rate:  [70-84] 70  Resp:  [16-18] 18  BP: ()/(60-69) 98/65     Physical Exam:  Constitutional - no acute distress, nontoxic, in bed  HEENT-NCAT, mucous membranes moist  CV-RRR  Resp-CTAB  Abd-soft, nontender to light palpation, drain with cloudy gray fluid  Ext-No lower extremity cyanosis, clubbing or edema bilaterally  Neuro-alert and oriented, speech clear, moves all extremities   Psych-normal affect   Skin- mild erythema mid abdomen          Results Reviewed:  LAB RESULTS:      Lab 23  0415 23  0506 23  1401 23  0916 23  0302 23  0006 23  1542   WBC 8.01 9.63  --   --  10.45 8.83 11.29*   HEMOGLOBIN 8.6* 8.7*  --   --  8.6* 9.3* 10.3*   HEMATOCRIT 27.4* 28.6*  --   --  27.7* 29.1* 32.3*   PLATELETS 623* 534*  --   --  544* 533* 605*   NEUTROS ABS 5.54 7.16*  --   --  7.75* 6.50 8.88*   IMMATURE GRANS (ABS) 0.03 0.04  --   --  0.03 0.03 0.04   LYMPHS ABS 1.59 1.41  --   --  1.46 1.39 1.25   MONOS ABS 0.70 0.82  --   --  1.17* 0.86 1.06*   EOS ABS 0.12 0.17  --   --  0.02 0.03 0.04   MCV 81.3 83.4  --   --  82.2 82.7 83.0   CRP  --  15.90*  --  17.88*  --   --   --    PROCALCITONIN  --   --   --   --   --  0.14 0.16   LACTATE  --   --   --   --   --  1.0 0.9   PROTIME  --   --  14.8*  --   --   --   --          Lab 23  0415  07/22/23  1741 07/22/23  0506 07/21/23  0302 07/21/23  0006 07/20/23  1542   SODIUM 141  --  138 139 140 139   POTASSIUM 4.3 4.1 3.6 4.0 3.7 3.8   CHLORIDE 108*  --  105 105 106 101   CO2 21.0*  --  18.0* 21.0* 20.0* 21.8*   ANION GAP 12.0  --  15.0 13.0 14.0 16.2*   BUN 17  --  14 13 13 14   CREATININE 0.59  --  0.61 0.67 0.70 0.70   EGFR 110.6  --  109.8 107.3 106.2 106.2   GLUCOSE 108*  --  68 158* 102* 87   CALCIUM 9.0  --  8.6 8.4* 8.5* 9.3         Lab 07/23/23  0415 07/22/23  0506 07/21/23  0302 07/21/23  0006 07/20/23  1542   TOTAL PROTEIN 6.5 6.3 5.7* 6.5 7.4   ALBUMIN 3.2* 2.8* 3.0* 3.1* 3.7   GLOBULIN 3.3 3.5 2.7 3.4 3.7   ALT (SGPT) 23 26 26 29 29   AST (SGOT) 22 26 26 29 34*   BILIRUBIN <0.2 <0.2 <0.2 <0.2 0.2   ALK PHOS 165* 154* 155* 161* 159*         Lab 07/21/23  1401   PROTIME 14.8*   INR 1.15*             Lab 07/21/23  0748 07/21/23  0302 07/21/23  0302 07/21/23  0006   IRON  --   --   --  7*  7*   IRON SATURATION (TSAT)  --   --   --  3*   TIBC  --   --   --  271*   TRANSFERRIN  --   --   --  182*   FERRITIN  --   --   --  90.24   FOLATE  --   --  17.30  --    VITAMIN B 12  --   --  553  --    ABO TYPING A   < > A  --    RH TYPING Positive   < > Positive  --    ANTIBODY SCREEN  --   --  Negative  --     < > = values in this interval not displayed.         Brief Urine Lab Results       None            Microbiology Results Abnormal       Procedure Component Value - Date/Time    AFB Culture - Drainage, Peritoneum [[id number]] Collected: 07/22/23 1157    Lab Status: Preliminary result Specimen: Drainage from Peritoneum Updated: 07/23/23 1129     AFB Stain No acid fast bacilli seen on concentrated smear    Blood Culture - Blood, Arm, Right [[id number]]  (Normal) Collected: 07/21/23 0000    Lab Status: Preliminary result Specimen: Blood from Arm, Right Updated: 07/23/23 0030     Blood Culture No growth at 2 days    Blood Culture - Blood, Hand, Right [[id number]]  (Normal) Collected: 07/21/23 0010    Lab  "Status: Preliminary result Specimen: Blood from Hand, Right Updated: 07/23/23 0030     Blood Culture No growth at 2 days    Narrative:                    CT Guided Abscess Drain Pelvis    Result Date: 7/23/2023  Clinical indication: Patient with history of appendiceal carcinoma, loculated subcutaneous/anterior abdominal wall fluid collection concerning for abscess : Dr. Villalta Given Procedures: 1.  CT-guided abscess drain placement 2.  Conscious sedation. Conscious sedation: Moderate sedation was provided by me for a total of 10 minutes.  Physical vitals were continuously monitored by an independently trained observer.  A total of 1 mg of Versed and 50 ug of fentanyl were administered intravenously. Contrast usage: None Estimated blood loss: Negligible Complication: None apparent. Technique: Formal written consent for the procedure was obtained from the patient after explaining risks to include bleeding, infection, injury to bowel/adjacent structures, need for additional surgery/procedure.  The patient's mid abdomen was prepped and draped in the usual, sterile fashion.  Local anesthesia with 1% lidocaine infiltration was achieved.  Additionally, intravenous fentanyl and Versed were given for patient comfort throughout the procedure, the details of which are documented in the nursing record.  Utilizing CT guidance, a 12 [place] pigtail drainage catheter was placed into the midline loculated subcutaneous/anterior abdominal wall fluid collection without difficulty.  10 mL of purulent fluid was aspirated and sent for culture/labs/cytology, per the referring service.  The catheter was positioned in the \"locked\" position and sutured into place.  A sterile bandage was applied to the access site, and the catheter was placed to suction on a GERMAINE drain.  The patient tolerated the procedure well without apparent complication. Impression: Successful CT-guided placement of a 12 [place] locking pigtail drainage catheter into " the midline subcutaneous/anterior abdominal wall fluid collection, with 10 mL of purulent fluid aspirated, as detailed above.          Current medications:  Scheduled Meds:DAPTOmycin, 6 mg/kg, Intravenous, Q24H  lactobacillus acidophilus, 1 capsule, Oral, Daily  multivitamin with minerals, 1 tablet, Oral, Daily  piperacillin-tazobactam, 3.375 g, Intravenous, Q8H  senna-docusate sodium, 2 tablet, Oral, BID  sodium chloride, 10 mL, Intravenous, Q12H      Continuous Infusions:     PRN Meds:.  aspirin-acetaminophen-caffeine    senna-docusate sodium **AND** polyethylene glycol **AND** bisacodyl **AND** bisacodyl    Calcium Replacement - Follow Nurse / BPA Driven Protocol    HYDROcodone-acetaminophen    Magnesium Standard Dose Replacement - Follow Nurse / BPA Driven Protocol    Phosphorus Replacement - Follow Nurse / BPA Driven Protocol    Potassium Replacement - Follow Nurse / BPA Driven Protocol    sodium chloride    Assessment & Plan   Assessment & Plan     Active Hospital Problems    Diagnosis  POA    **Abscess [L02.91]  Yes    Abdominal wall abscess [L02.211]  Yes    History of cancer [Z85.9]  Not Applicable    Normocytic anemia [D64.9]  Yes      Resolved Hospital Problems   No resolved problems to display.        Brief Hospital Course to date:  Patricia Castillo is a 47 y.o. female with history of appendiceal cancer s/p debulking x3 presents with abdominal pain, redness and fever    Abdominal wall abscess  - small open lesion under right breast -- however difficult to appreciate tracking to the abdominal abscess on CT  - drain placement 7/22/23 -- gram stain appears polymicrobial with culture showing light growth GNR  - zosyn/dapto  - PICC placed today    Appendiceal cancer  - s/p debulking x 3 with recurrence    Anemia  - hemoglobin 8.6  - iron sat 3%, mcv 82 -- will need iron, IV vs oral    Thrombocytosis  - platelets 623  - likely secondary to infection/inflammatory state      Expected Discharge Location and  Transportation:   Expected Discharge   Expected Discharge Date: 7/28/2023; Expected Discharge Time:      DVT prophylaxis:  likely add subq heparin when procedures completed  Mechanical DVT prophylaxis orders are present.          CODE STATUS:   Code Status and Medical Interventions:   Ordered at: 07/21/23 0111     Level Of Support Discussed With:    Patient     Code Status (Patient has no pulse and is not breathing):    CPR (Attempt to Resuscitate)     Medical Interventions (Patient has pulse or is breathing):    Full Support     Release to patient:    Routine Release       Anthony Melara MD  07/23/23

## 2023-07-23 NOTE — PROGRESS NOTES
General Surgery Daily Progress Note    Subjective:  Feeling better after drainage.    Objective:  BP 91/68 (BP Location: Right arm, Patient Position: Sitting)   Pulse 73   Temp 97.8 °F (36.6 °C) (Oral)   Resp 18   Wt 53.8 kg (118 lb 9.6 oz)   SpO2 97%   BMI 22.05 kg/m²       General Appearance: Mild distress  Eyes: Anicteric  Neck: Trachea midline   Cardiovascular:  RRR without murmur nor rub  Lungs:  Bilateral respirations unlabored   Abdomen:  Soft, less erythema, decreased tenderness  Extremities:  No cyanosis or edema   Skin:  No obvious rashes   Neurologic: awake and conversant       CBC:  Results from last 7 days   Lab Units 07/23/23  0415   WBC 10*3/mm3 8.01   HEMOGLOBIN g/dL 8.6*   HEMATOCRIT % 27.4*   PLATELETS 10*3/mm3 623*       CMP:  Results from last 7 days   Lab Units 07/23/23  0415   SODIUM mmol/L 141   POTASSIUM mmol/L 4.3   CHLORIDE mmol/L 108*   CO2 mmol/L 21.0*   BUN mg/dL 17   CREATININE mg/dL 0.59   CALCIUM mg/dL 9.0   BILIRUBIN mg/dL <0.2   ALK PHOS U/L 165*   ALT (SGPT) U/L 23   AST (SGOT) U/L 22   GLUCOSE mg/dL 108*         Assessment:  Abdominal wall abscess status postdrainage    Plan:   IV antibiotics and drainage for now.  Teach drain care.  Difficult situation.  Ultimately recommend follow-up at [facility].    Inocente Bowden MD - 7/23/2023, 10:24 EDT

## 2023-07-24 ENCOUNTER — READMISSION MANAGEMENT (OUTPATIENT)
Dept: CALL CENTER | Facility: HOSPITAL | Age: 48
End: 2023-07-24
Payer: COMMERCIAL

## 2023-07-24 VITALS
OXYGEN SATURATION: 97 % | SYSTOLIC BLOOD PRESSURE: 96 MMHG | BODY MASS INDEX: 22.05 KG/M2 | WEIGHT: 118.6 LBS | HEART RATE: 71 BPM | DIASTOLIC BLOOD PRESSURE: 55 MMHG | RESPIRATION RATE: 16 BRPM | TEMPERATURE: 97.6 F

## 2023-07-24 PROCEDURE — 25010000002 PIPERACILLIN SOD-TAZOBACTAM PER 1 G: Performed by: NURSE PRACTITIONER

## 2023-07-24 PROCEDURE — 25010000002 ERTAPENEM PER 500 MG: Performed by: INTERNAL MEDICINE

## 2023-07-24 PROCEDURE — 25010000002 DAPTOMYCIN PER 1 MG: Performed by: INTERNAL MEDICINE

## 2023-07-24 RX ORDER — LEVOFLOXACIN 750 MG/1
750 TABLET ORAL EVERY 24 HOURS
Status: DISCONTINUED | OUTPATIENT
Start: 2023-07-24 | End: 2023-07-24 | Stop reason: HOSPADM

## 2023-07-24 RX ORDER — MULTIPLE VITAMINS W/ MINERALS TAB 9MG-400MCG
1 TAB ORAL DAILY
Start: 2023-07-24

## 2023-07-24 RX ORDER — LEVOFLOXACIN 750 MG/1
750 TABLET ORAL EVERY 24 HOURS
Qty: 13 TABLET | Refills: 0 | Status: SHIPPED | OUTPATIENT
Start: 2023-07-25 | End: 2023-08-07

## 2023-07-24 RX ORDER — IRON POLYSACCH/IRON HEME POLYP 28 MG
1 TABLET ORAL EVERY OTHER DAY
Qty: 30 TABLET | Refills: 0 | Status: SHIPPED | OUTPATIENT
Start: 2023-07-24

## 2023-07-24 RX ADMIN — ERTAPENEM 1000 MG: 1 INJECTION INTRAMUSCULAR; INTRAVENOUS at 10:54

## 2023-07-24 RX ADMIN — PIPERACILLIN SODIUM AND TAZOBACTAM SODIUM 3.38 G: 3; .375 INJECTION, SOLUTION INTRAVENOUS at 01:03

## 2023-07-24 RX ADMIN — Medication 1 CAPSULE: at 09:40

## 2023-07-24 RX ADMIN — MULTIPLE VITAMINS W/ MINERALS TAB 1 TABLET: TAB at 09:40

## 2023-07-24 RX ADMIN — Medication 10 ML: at 09:40

## 2023-07-24 RX ADMIN — LEVOFLOXACIN 750 MG: 750 TABLET, FILM COATED ORAL at 09:40

## 2023-07-24 RX ADMIN — DAPTOMYCIN 300 MG: 500 INJECTION, POWDER, LYOPHILIZED, FOR SOLUTION INTRAVENOUS at 12:12

## 2023-07-24 NOTE — PROGRESS NOTES
[facility]    INFECTIOUS DISEASE PROGRESS NOTE    Patricia Castillo  1975  [id number]    Consult: 7/21/23    Admit date: 7/20/2023    Requesting Provider: Sendy Rios DO  Evaluating physician: James Jansen MD  Reason for Consultation: Abdominal wall abscess  Chief Complaint: Above      Subjective   History of present illness:  Patient is a  47 y.o.  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  Developed purulent drainage and was evaluated by primary care and placed on Bactrim.  Then admitted to [facility] where CT scan revealed findings consistent with abdominal wall abscess.  The patient was transferred and admitted to [facility] on 7/20/2023.  I was consulted on 7/21/2023.  The patient has no other localizing signs or symptoms of infection.  She denies ill contacts, TB, HIV, zoonotic exposures.    7/22/2023 history reviewed.  Underwent CT-guided drainage of abdominal wall abscess on 7/22 by Pawan Engel MD.  No high fevers or chills.  Tolerating daptomycin and piperacillin/tazobactam, duration to be determined.  Blood cultures x2 negative.  Cultures and cytology from purulent fluid from abdominal wall pending.    7/24/2023 history reviewed.  Tolerating piperacillin/tazobactam and daptomycin for abdominal wall abscess.  Status post CT-guided drainage 7/22.  Body fluid culture growing gram-negative rods.  Also has gram-positive cocci.  Fevers resolved.  Changing to dapto, ertapenem, and oral levofloxacin awaiting cultures.    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    Cancer 8-2013    Appendiceal Cancer    Headache 1985     Heart murmur 1988       Past Surgical History:   Procedure Laterality Date    APPENDECTOMY  8-5-2013    Appendix removed cancer found    CHOLECYSTECTOMY  10-1-2013    Removed during cancer debulking surgery    COLON SURGERY  10-1-2014    Partial removal during cancer debulking surgery    COLONOSCOPY  9-30-13    HYSTERECTOMY  8-5-2013    Full hysterectomy    OOPHORECTOMY      SMALL INTESTINE SURGERY  10-1-2013    Partial removal during cancer debulking & HIPEC    TONSILLECTOMY  1993       Pediatric History   Patient Parents    Not on file     Other Topics Concern    Not on file   Social History Narrative    Not on file   Previous smoking history but negative since 29 years ago.  No alcohol or drug use.  .  2 grown children.  Originally from [place],  works from home on computer.    family history includes Alcohol abuse in her paternal aunt and sister; Anxiety disorder in her sister; Cancer in her paternal grandfather; Hyperlipidemia in her mother; Other in her maternal grandmother and mother.    Allergies   Allergen Reactions    Chloraseptic Sore Throat [Acetaminophen] Hives    Phenol Hives and Rash       Immunization History   Administered Date(s) Administered    COVID-19 (MODERNA) 1st,2nd,3rd Dose Monovalent 03/09/2021, 04/07/2021, 11/07/2021    COVID-19 (PFIZER) BIVALENT 12+YRS 09/30/2022    FluLaval/Fluzone >6mos 09/21/2018, 10/15/2020, 10/03/2021, 09/30/2022    Meningococcal B,(Bexsero) 10/29/2020, 11/29/2020    Meningococcal MCV4P (Menactra) 10/30/2020    Pneumococcal Conjugate 13-Valent (PCV13) 10/30/2020    Pneumococcal Polysaccharide (PPSV23) 12/26/2020    Tdap 12/06/2022       Medication:    Current Facility-Administered Medications:     aspirin-acetaminophen-caffeine (EXCEDRIN MIGRAINE) 250-250-65 MG per tablet 1 tablet, 1 tablet, Oral, Q6H PRN, Uma, Eliza, APRN, 1 tablet at 07/21/23 0347    sennosides-docusate (PERICOLACE) 8.6-50 MG per tablet 2 tablet, 2 tablet, Oral, BID **AND**  polyethylene glycol (MIRALAX) packet 17 g, 17 g, Oral, Daily PRN **AND** bisacodyl (DULCOLAX) EC tablet 5 mg, 5 mg, Oral, Daily PRN **AND** bisacodyl (DULCOLAX) suppository 10 mg, 10 mg, Rectal, Daily PRN, Uma, Eliza, APRN    Calcium Replacement - Follow Nurse / BPA Driven Protocol, , Does not apply, PRN, Uma, Eliza, APRN    DAPTOmycin (CUBICIN) 300 mg in sodium chloride 0.9 % 50 mL IVPB, 6 mg/kg, Intravenous, Q24H, James Jansen MD, Last Rate: 100 mL/hr at 07/23/23 1132, 300 mg at 07/23/23 1132    HYDROcodone-acetaminophen (NORCO) 5-325 MG per tablet 1 tablet, 1 tablet, Oral, Q4H PRN, Uma, Eliza, APRN, 1 tablet at 07/21/23 0936    lactobacillus acidophilus (RISAQUAD) capsule 1 capsule, 1 capsule, Oral, Daily, Uma, Eliza, APRN, 1 capsule at 07/23/23 0856    Magnesium Standard Dose Replacement - Follow Nurse / BPA Driven Protocol, , Does not apply, PRN, Uma, Eliza, APRN    multivitamin with minerals 1 tablet, 1 tablet, Oral, Daily, Uma, Eliza, APRN, 1 tablet at 07/23/23 0856    Phosphorus Replacement - Follow Nurse / BPA Driven Protocol, , Does not apply, PRN, Uma, Eliza, APRN    piperacillin-tazobactam (ZOSYN) 3.375 g in iso-osmotic dextrose 50 ml (premix), 3.375 g, Intravenous, Q8H, Uma, Eliza, APRN, 3.375 g at 07/24/23 0103    Potassium Replacement - Follow Nurse / BPA Driven Protocol, , Does not apply, PRN, Uma, Eliza, APRN    sodium chloride 0.9 % flush 10 mL, 10 mL, Intravenous, Q12H, James Jansen MD, 10 mL at 07/23/23 2045    sodium chloride 0.9 % flush 10 mL, 10 mL, Intravenous, PRN, James Jansen MD    Please refer to the medical record for a full medication list    Review of Systems:    Constitutional--low-grade fever, no chills or sweats.  Appetite good, and no malaise. No fatigue.  HEENT-- No new vision, hearing or throat complaints.  No epistaxis or oral sores.  Denies odynophagia or dysphagia.  No odynophagia or dysphagia. No  headache, photophobia or neck stiffness.  CV-- No chest pain, palpitation or syncope  Resp-- No SOB/cough/Hemoptysis  GI- No nausea, vomiting, or diarrhea.  No hematochezia, melena, or hematemesis. Denies jaundice or chronic liver disease.  Abdominal wall pain as per HPI  -- No dysuria, hematuria, or flank pain.  Denies hesitancy, urgency or flank pain.  Lymph- no swollen lymph nodes in neck/axilla or groin.   Heme- No active bruising or bleeding; no Hx of DVT or PE.  MS-- no swelling or pain in the bones or joints of arms/legs.  No new back pain.  Neuro-- No acute focal weakness or numbness in the arms or legs.  No seizures.  Skin--No rashes or lesions, except abdominal wall as per HPI, no nodules    Physical Exam:   Vital Signs   Temp:  [97.1 °F (36.2 °C)-98.6 °F (37 °C)] 97.4 °F (36.3 °C)  Heart Rate:  [54-82] 82  Resp:  [18] 18  BP: (81-98)/(50-75) 94/61    Temp  Min: 97.1 °F (36.2 °C)  Max: 98.6 °F (37 °C)  BP  Min: 81/54  Max: 98/65  Pulse  Min: 54  Max: 82  Resp  Min: 18  Max: 18  SpO2  Min: 97 %  Max: 98 %    Blood pressure 94/61, pulse 82, temperature 97.4 °F (36.3 °C), temperature source Oral, resp. rate 18, weight 53.8 kg (118 lb 9.6 oz), SpO2 98 %.  GENERAL: Awake and alert, in moderate distress. Appears older than stated age.  HEENT:  Normocephalic, atraumatic.  Oropharynx without thrush. Dentition in good repair. No cervical adenopathy. No neck masses.  Ears externally normal, Nose externally normal.  EYES: No conjunctival injection. No icterus. EOM full.  LYMPHATICS: No lymphadenopathy of the neck or axillary or inguinal regions.   HEART: No murmur, gallop, or pericardial friction rub. Reg rate rhythm, No JVD at 45 degrees.  LUNGS: Clear to auscultation and percussion. No respiratory distress, no use of accessory muscles.  No wheezes.  ABDOMEN: Soft, tender to deep palpation along previous surgical site without crepitus or bullae, with some purulent discharge, nondistended. No appreciable HSM.   Bowel sounds normal.  SKIN: Warm and dry without cutaneous eruptions.  No nodules.  Area under right breast old track small wound, no pus or erythema.  Abd wall below umbilicus about 7 cm long with erythema, induration, no crepitus, fluctuance.  Increased heat.  PSYCHIATRIC: Mental status lucid. No confusion.  EXT:  No cellulitic change.  NEURO: Oriented to name, nonfocal      7/21/23    Results Review:   I reviewed the patient's new clinical results.  I reviewed the patient's new imaging results and agree with the interpretation.  I reviewed the patient's other test results and agree with the interpretation    Results from last 7 days   Lab Units 07/23/23  0415 07/22/23  0506 07/21/23  0302   WBC 10*3/mm3 8.01 9.63 10.45   HEMOGLOBIN g/dL 8.6* 8.7* 8.6*   HEMATOCRIT % 27.4* 28.6* 27.7*   PLATELETS 10*3/mm3 623* 534* 544*       Results from last 7 days   Lab Units 07/23/23  0415   SODIUM mmol/L 141   POTASSIUM mmol/L 4.3   CHLORIDE mmol/L 108*   CO2 mmol/L 21.0*   BUN mg/dL 17   CREATININE mg/dL 0.59   GLUCOSE mg/dL 108*   CALCIUM mg/dL 9.0       Results from last 7 days   Lab Units 07/23/23  0415   ALK PHOS U/L 165*   BILIRUBIN mg/dL <0.2   ALT (SGPT) U/L 23   AST (SGOT) U/L 22           Results from last 7 days   Lab Units 07/22/23  0506   CRP mg/dL 15.90*           Results from last 7 days   Lab Units 07/21/23  0006   LACTATE mmol/L 1.0       Estimated Creatinine Clearance: 98 mL/min (by C-G formula based on SCr of 0.59 mg/dL).  CPK          7/22/2023    05:06   Common Labsle   Creatine Kinase 30       Procalitonin Results:      Lab 07/21/23  0006 07/20/23  1542   PROCALCITONIN 0.14 0.16        Brief Urine Lab Results       None           No results found for: SITE, ALLENTEST, PHART, DCK2FFC, PO2ART, ADZ8BLE, BASEEXCESS, M4GIAGBP, HGBBG, HCTABG, OXYHEMOGLOBI, METHHGBN, CARBOXYHGB, CO2CT, BAROMETRIC, MODALITY, FIO2     Microbiology:  No results found for: ACANTHNAEG, AFBCX, BPERTUSSISCX, BLOODCX  No results found  for: BCIDPCR, CXREFLEX, CSFCX, CULTURETIS  No results found for: CULTURES, HSVCX, URCX  No results found for: EYECULTURE, GCCX, HSVCULTURE, LABHSV  No results found for: LEGIONELLA, MRSACX, MUMPSCX, MYCOPLASCX  No results found for: NOCARDIACX, STOOLCX  No results found for: THROATCX, UNSTIMCULT, URINECX, CULTURE, VZVCULTUR  No results found for: VIRALCULTU, WOUNDCX     No results found for: TISSCXQ, CULTURES, CULTURE, BLOODCX, ANACX, BALCX, ACIDFASTCX, BODYFLDCX, CXREFLEX, FUNGUSCX, RESPCX, MRSACX, ROUTCX, BRCHWSHCLT, TISSUECX, URINECX, CMVCX, WOUNDCX, BCIDPCR, BFCULTURE, BLOODCULT(    Abdominal wall cultures 7/22/2023 for AFB, fungal, routine culture pending, growing gram-negative rods, also with gram-positive cocci.    Radiology:  Imaging Results (Last 72 Hours)       Procedure Component Value Units Date/Time    CT Guided Abscess Drain Pelvis [[id number]] Resulted: 07/23/23 1158     Updated: 07/23/23 1202    Narrative:      Clinical indication: Patient with history of appendiceal carcinoma,   loculated subcutaneous/anterior abdominal wall fluid collection concerning   for abscess    : Dr. Villalta Given    Procedures:  1.  CT-guided abscess drain placement  2.  Conscious sedation.    Conscious sedation: Moderate sedation was provided by me for a total of 10   minutes.  Physical vitals were continuously monitored by an independently   trained observer.  A total of 1 mg of Versed and 50 ug of fentanyl were   administered intravenously.    Contrast usage: None    Estimated blood loss: Negligible    Complication: None apparent.    Technique: Formal written consent for the procedure was obtained from the   patient after explaining risks to include bleeding, infection, injury to   bowel/adjacent structures, need for additional surgery/procedure.  The   patient's mid abdomen was prepped and draped in the usual, sterile   fashion.  Local anesthesia with 1% lidocaine infiltration was achieved.    Additionally,  "intravenous fentanyl and Versed were given for patient   comfort throughout the procedure, the details of which are documented in   the nursing record.  Utilizing CT guidance, a 12 [place] pigtail drainage   catheter was placed into the midline loculated subcutaneous/anterior   abdominal wall fluid collection without difficulty.  10 mL of purulent   fluid was aspirated and sent for culture/labs/cytology, per the referring   service.  The catheter was positioned in the \"locked\" position and sutured   into place.  A sterile bandage was applied to the access site, and the   catheter was placed to suction on a GERMAINE drain.  The patient tolerated the   procedure well without apparent complication.    Impression: Successful CT-guided placement of a 12 [place] locking pigtail   drainage catheter into the midline subcutaneous/anterior abdominal wall   fluid collection, with 10 mL of purulent fluid aspirated, as detailed   above.              IMPRESSION:     1.  Abdominal wall abscess with 6.5 x 3.5 cm air-fluid collection seen on CT scan at [facility] on 7/20/2023.  Usual organisms are staphylococci versus potentially bowel vernon given the history of previous surgeries for intra-abdominal pathology.  The abdominal wall abscess may also be related to metastatic cancer to the abdominal wall.  Previous biologic mesh used 2021 per report in abdominal surgery per patient.  Doubt related.  Status post CT-guided drainage 7/22/2023.  Cultures from 7/22 growing gram-negative rods and also have gram-positive cocci.  2.  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  Related to previous metastatic adenocarcinoma.  3.  History of appendiceal carcinoma status post debulking procedures x3 at [facility].  Data deficit.  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  Surgery October 2013, November 2015, January " 2021 with intraperitoneal cisplatin.  Previous CT scan at [facility] in February 2023 with noted increase in mucinous peritoneal tumor implants adjacent to gastric fundus, right lower quadrant, supraumbilical, right pelvis, left pelvic implant.  Previous right hemicolectomy, cholecystectomy, splenectomy, omentectomy, hysterectomy, and bilateral salpingo-oophorectomy.  Previous chronic right upper quadrant wound was managed by local wound care per the [facility] note February 2023.  Edgard Frances MD.  4.  Anemia, chronic disease.  Worse.  5.  Elevated alkaline phosphatase 165, worse, CT scan of the abdomen and pelvis without obvious liver pathology.    Plan:    1.  Diagnostically, continue to follow patient's physical exam, CBC, CMP, CRP, blood cultures x2, follow abdominal wall culture from 7/22 aspiration for routine C&S, AFB, and fungus, as well as cytology.  Follow CPK weekly while on IV daptomycin.  CPK 7/22 was 30.  2.  Therapeutically, changed to daptomycin and ertapenem and oral levofloxacin pending further culture data.  Duration of therapy to be determined.  Likely to be until 8/20/2023.  May drop the levofloxacin once the gram-negative toma is identified with sensitivity.   ms.  3.  Supportive care.  CT-guided drainage 7/22.  Also d/w [facility].    I discussed the patient's findings and my recommendations with patient and nursing staff    Case management orders: Please arrange for outpatient IV antibiotics with [facility] with daptomycin 300 mg IV daily, ertapenem 1 g IV daily until 8/22/2023 for abdominal wall abscess, status post drainage 7/22.  Check CBC, CMP, CRP, CPK weekly while on IV antibiotics.  Fax orders to [phone], call [phone] with final arrangements.  Arrange for follow-up with me in 1 week postdischarge.  Weekly PICC dressing changes.  Also added levofloxacin 750 mg p.o. daily awaiting gram-negative toma identification.    Thank you for asking me to  see Patricia Castillo.  Our group would be pleased to follow this patient over the course of their hospitalization and assist with outpatient antimicrobial therapy, as indicated.  Further recommendations depend on the results of the cultures and clinical course.  Increased risk for adverse drug reactions, complications of IV access, readmission.  D/w previously patients , and Dr. Melara.    James Jansen MD  7/24/2023

## 2023-07-24 NOTE — PLAN OF CARE
Goal Outcome Evaluation:     A&Ox4. VSS. NSR. Independent. Discharge teaching done. Pt refused wound care on sore under breast-mepilex clean, dry, and intact. Teaching done on how to drain GERMAINE drain at home.  to transport home. Safety maintained.

## 2023-07-24 NOTE — DISCHARGE SUMMARY
[facility] Medicine Services  DISCHARGE SUMMARY    Patient Name: Patricia Castillo  : 1975  MRN: [id number]    Date of Admission: 2023 10:12 PM  Date of Discharge:  23  Primary Care Physician: Orly Orta APRN    Consults       Date and Time Order Name Status Description    2023 12:56 PM Inpatient Infectious Diseases Consult Completed     2023  8:33 AM Inpatient General Surgery Consult Completed     2023  3:14 AM Inpatient Infectious Diseases Consult Completed             Hospital Course     Presenting Problem: abscess    Active Hospital Problems    Diagnosis  POA    **Abscess [L02.91]  Yes    Abdominal wall abscess [L02.211]  Yes    History of cancer [Z85.9]  Not Applicable    Normocytic anemia [D64.9]  Yes      Resolved Hospital Problems   No resolved problems to display.          Hospital Course:  [facility] Course to date:  Patricia Castillo is a 47 y.o. female with history of appendiceal cancer s/p debulking x3 presents with abdominal pain, redness and fever     Abdominal wall abscess  - small open lesion under right breast -- however difficult to appreciate tracking to the abdominal abscess on CT  - drain placement 23 -- gram stain appears polymicrobial with culture showing light growth GNR  - zosyn/dapto  - PICC      Appendiceal cancer  - s/p debulking x 3 with recurrence     Anemia  - hemoglobin 8.6  - iron sat 3%, mcv 82 -- start oral iron every other day  - prior hysterectomy  - follow up with PCP in one week with further evaluation for iron def anemia as appropriate      Thrombocytosis  - platelets 623  - likely secondary to infection/inflammatory state      Discharge Follow Up Recommendations for outpatient labs/diagnostics:     CBC, CMP, CRP, CPK weekly while on IV antibiotics     Day of Discharge     HPI:   Feels ok, no fever.  Thinks redness on stomach is stable.  Tolerating drain well, still having cloudy/yellow  output    Review of Systems  Gen: no fever  GI: abdomen slightly tender, no abdominal pain  Pulm: no cough    Vital Signs:   Temp:  [97.1 °F (36.2 °C)-98.6 °F (37 °C)] 97.4 °F (36.3 °C)  Heart Rate:  [54-82] 54  Resp:  [18] 18  BP: (81-98)/(50-75) 94/61      Physical Exam:  Constitutional - no acute distress, nontoxic, walking in room  HEENT-NCAT, mucous membranes moist  CV-RRR  Resp-CTAB  Abd-soft, mild tenderness, drain on right  Ext-No lower extremity cyanosis, clubbing or edema bilaterally  Neuro-alert and oriented, speech clear, moves all extremities   Psych-normal affect   Skin- erythema stable lower abdomen      Pertinent  and/or Most Recent Results     LAB RESULTS:      Lab 07/23/23  0415 07/22/23  0506 07/21/23  1401 07/21/23  0916 07/21/23  0302 07/21/23  0006 07/20/23  1542   WBC 8.01 9.63  --   --  10.45 8.83 11.29*   HEMOGLOBIN 8.6* 8.7*  --   --  8.6* 9.3* 10.3*   HEMATOCRIT 27.4* 28.6*  --   --  27.7* 29.1* 32.3*   PLATELETS 623* 534*  --   --  544* 533* 605*   NEUTROS ABS 5.54 7.16*  --   --  7.75* 6.50 8.88*   IMMATURE GRANS (ABS) 0.03 0.04  --   --  0.03 0.03 0.04   LYMPHS ABS 1.59 1.41  --   --  1.46 1.39 1.25   MONOS ABS 0.70 0.82  --   --  1.17* 0.86 1.06*   EOS ABS 0.12 0.17  --   --  0.02 0.03 0.04   MCV 81.3 83.4  --   --  82.2 82.7 83.0   CRP  --  15.90*  --  17.88*  --   --   --    PROCALCITONIN  --   --   --   --   --  0.14 0.16   LACTATE  --   --   --   --   --  1.0 0.9   PROTIME  --   --  14.8*  --   --   --   --          Lab 07/23/23  0415 07/22/23  1741 07/22/23  0506 07/21/23  0302 07/21/23  0006 07/20/23  1542   SODIUM 141  --  138 139 140 139   POTASSIUM 4.3 4.1 3.6 4.0 3.7 3.8   CHLORIDE 108*  --  105 105 106 101   CO2 21.0*  --  18.0* 21.0* 20.0* 21.8*   ANION GAP 12.0  --  15.0 13.0 14.0 16.2*   BUN 17  --  14 13 13 14   CREATININE 0.59  --  0.61 0.67 0.70 0.70   EGFR 110.6  --  109.8 107.3 106.2 106.2   GLUCOSE 108*  --  68 158* 102* 87   CALCIUM 9.0  --  8.6 8.4* 8.5* 9.3          Lab 07/23/23  0415 07/22/23  0506 07/21/23  0302 07/21/23  0006 07/20/23  1542   TOTAL PROTEIN 6.5 6.3 5.7* 6.5 7.4   ALBUMIN 3.2* 2.8* 3.0* 3.1* 3.7   GLOBULIN 3.3 3.5 2.7 3.4 3.7   ALT (SGPT) 23 26 26 29 29   AST (SGOT) 22 26 26 29 34*   BILIRUBIN <0.2 <0.2 <0.2 <0.2 0.2   ALK PHOS 165* 154* 155* 161* 159*         Lab 07/21/23  1401   PROTIME 14.8*   INR 1.15*             Lab 07/21/23  0748 07/21/23  0302 07/21/23  0302 07/21/23  0006   IRON  --   --   --  7*  7*   IRON SATURATION (TSAT)  --   --   --  3*   TIBC  --   --   --  271*   TRANSFERRIN  --   --   --  182*   FERRITIN  --   --   --  90.24   FOLATE  --   --  17.30  --    VITAMIN B 12  --   --  553  --    ABO TYPING A   < > A  --    RH TYPING Positive   < > Positive  --    ANTIBODY SCREEN  --   --  Negative  --     < > = values in this interval not displayed.         Brief Urine Lab Results       None          Microbiology Results (last 10 days)       Procedure Component Value - Date/Time    Body Fluid Culture - Drainage, Peritoneum [[id number]]  (Abnormal) Collected: 07/22/23 1157    Lab Status: Preliminary result Specimen: Drainage from Peritoneum Updated: 07/23/23 1041     Body Fluid Culture Light growth (2+) Gram Negative Bacilli     Gram Stain Many (4+) WBCs seen      Few (2+) Gram negative bacilli      Few (2+) Gram positive bacilli      Rare (1+) Gram positive cocci in pairs    AFB Culture - Drainage, Peritoneum [[id number]] Collected: 07/22/23 1157    Lab Status: Preliminary result Specimen: Drainage from Peritoneum Updated: 07/23/23 1129     AFB Stain No acid fast bacilli seen on concentrated smear    Blood Culture - Blood, Hand, Right [[id number]]  (Normal) Collected: 07/21/23 0010    Lab Status: Preliminary result Specimen: Blood from Hand, Right Updated: 07/24/23 0030     Blood Culture No growth at 3 days    Narrative:            Blood Culture - Blood, Arm, Right [[id number]]  (Normal) Collected: 07/21/23 0000    Lab Status: Preliminary result  Specimen: Blood from Arm, Right Updated: 07/24/23 0030     Blood Culture No growth at 3 days            CT Abdomen Pelvis With Contrast    Result Date: 7/20/2023  FINAL REPORT TECHNIQUE: Routine axial images through the abdomen and pelvis were obtained following IV contrast administration. CLINICAL HISTORY: abdominal wall pain, induration and erythema, hx of appendiceal cancer FINDINGS: Abdomen: The gallbladder is not visualized.  The solid abdominal organs and ureters are unremarkable.  Multiloculated fluid collections are seen in the anterior peritoneum adjacent to the liver worrisome for pseudomyxoma peritonei.  Similar small collections are seen on the left side of the anterior abdomen.  Within the midline anterior bone wall is a 6.5 x 3.5 cm air and fluid collection consistent with abscess.  The distal colon is dilated with air and fluid.  There are some loops of dilated air and fluid-filled small bowel in the abdomen and pelvis. Pelvis:Probable loculated fluid collections are seen in the pelvis . The urinary bladder is not well visualized.  The uterus and ovaries are not visualized  There is no pelvic or abdominal ascites, adenopathy or acute osseous abnormality.     Abscess within the midline anterior abdominal wall.  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  Dilation within the colon and loops of small bowel of uncertain etiology, partial bowel obstruction cannot be excluded. Authenticated and Electronically Signed by Jose D Jay M.D. on 07/20/2023 07:03:55 PM    CT Guided Abscess Drain Pelvis    Result Date: 7/23/2023  Clinical indication: Patient with history of appendiceal carcinoma, loculated subcutaneous/anterior abdominal wall fluid collection concerning for abscess : Dr. Villalta Given Procedures: 1.  CT-guided abscess drain placement 2.  Conscious sedation. Conscious sedation: Moderate sedation was provided by me for a total of 10 minutes.  " Physical vitals were continuously monitored by an independently trained observer.  A total of 1 mg of Versed and 50 ug of fentanyl were administered intravenously. Contrast usage: None Estimated blood loss: Negligible Complication: None apparent. Technique: Formal written consent for the procedure was obtained from the patient after explaining risks to include bleeding, infection, injury to bowel/adjacent structures, need for additional surgery/procedure.  The patient's mid abdomen was prepped and draped in the usual, sterile fashion.  Local anesthesia with 1% lidocaine infiltration was achieved.  Additionally, intravenous fentanyl and Versed were given for patient comfort throughout the procedure, the details of which are documented in the nursing record.  Utilizing CT guidance, a 12 Croatian pigtail drainage catheter was placed into the midline loculated subcutaneous/anterior abdominal wall fluid collection without difficulty.  10 mL of purulent fluid was aspirated and sent for culture/labs/cytology, per the referring service.  The catheter was positioned in the \"locked\" position and sutured into place.  A sterile bandage was applied to the access site, and the catheter was placed to suction on a GERMAINE drain.  The patient tolerated the procedure well without apparent complication. Impression: Successful CT-guided placement of a 12 Croatian locking pigtail drainage catheter into the midline subcutaneous/anterior abdominal wall fluid collection, with 10 mL of purulent fluid aspirated, as detailed above.                  Plan for Follow-up of Pending Labs/Results:   Pending Labs       Order Current Status    Anaerobic Culture - Drainage, Peritoneum In process    Fungus Culture - Drainage, Peritoneum In process    NON-GYN CYTOLOGY, [facility] ([facility]) In process    AFB Culture - Drainage, Peritoneum Preliminary result    Blood Culture - Blood, Arm, Right Preliminary result    Blood Culture - Blood, Hand, Right " Preliminary result    Body Fluid Culture - Drainage, Peritoneum Preliminary result          Discharge Details        Discharge Medications        New Medications        Instructions Start Date   DAPTOmycin 300 mg in sodium chloride 0.9 % 50 mL   6 mg/kg (300 mg), Intravenous, Every 24 Hours   Start Date: July 25, 2023     ertapenem 1000 mg/100ml solution IV  Commonly known as: INVanz   1,000 mg, Intravenous, Every 24 Hours   Start Date: July 25, 2023     Feosol Bifera 28 MG tablet   28 mg, Oral, Every Other Day, Do not take within 4 hours of Levaquin dose      levoFLOXacin 750 MG tablet  Commonly known as: LEVAQUIN   750 mg, Oral, Every 24 Hours   Start Date: July 25, 2023            Changes to Medications        Instructions Start Date   multivitamin with minerals tablet tablet  What changed:   when to take this  additional instructions   1 tablet, Oral, Daily, Do not take multivitamin within 4 hours of Levaquin dose             Continue These Medications        Instructions Start Date   aspirin 81 MG EC tablet   81 mg, Oral, Daily      EXCEDRIN MIGRAINE PO   1 tablet, Oral, Daily      PROBIOTIC DAILY PO   Oral, UQORA - 3 part probiotic for bladder              Stop These Medications      sulfamethoxazole-trimethoprim 800-160 MG per tablet  Commonly known as: Bactrim DS              Allergies   Allergen Reactions    Chloraseptic Sore Throat [Acetaminophen] Hives    Phenol Hives and Rash         Discharge Disposition:  Home or Self Care    Diet:  Hospital:  Diet Order   Procedures    Diet: Regular/House Diet; Texture: Regular Texture (IDDSI 7); Fluid Consistency: Thin (IDDSI 0)       Activity:      Restrictions or Other Recommendations:         CODE STATUS:    Code Status and Medical Interventions:   Ordered at: 07/21/23 0111     Level Of Support Discussed With:    Patient     Code Status (Patient has no pulse and is not breathing):    CPR (Attempt to Resuscitate)     Medical Interventions (Patient has pulse or is  breathing):    Full Support     Release to patient:    Routine Release       Future Appointments   Date Time Provider Department Center   7/31/2023  4:00 PM Mahamed Su MD [facility]   12/8/2023  8:30 AM Orly Orta APRN [facility]     Follow ups    ID Dr Jansen in one week  Dr Frances at [facility] as scheduled  PCP in one week            Anthony Melara MD  07/24/23      Time Spent on Discharge:  I spent  35  minutes on this discharge activity which included: face-to-face encounter with the patient, reviewing the data in the system, coordination of the care with the nursing staff as well as consultants, documentation, and entering orders.

## 2023-07-24 NOTE — DISCHARGE PLACEMENT REQUEST
Patricia Castillo (47 y.o. Female)    [facility]  [facility]           Inpatient Case Management  Consult: Patient Communication     Not Released  Not seen     Results  Inpatient Case Management  Consult (Order [id number])  Inpatient Case Management  Consult  Order: [id number]  Status: Completed       Visible to patient: No (not released)    0 Result Notes                Order Details        View Encounter        Lab and Collection Details        Routing        Result History     View Encounter Conversation           Result Care Coordination      Patient Communication     Not Released  Not seen Back to Top             Inpatient Case Management  Consult: Patient Communication     Not Released  Not seen     Results Routing Tracking    View Results Routing Information     Order Report     Order Details  Inpatient Case Management  Consult [[id number]] (Order [id number])  Order  Date: 7/22/2023 Department: [address] Released By/Authorizing: James Jansen MD (auto-released)     Order History  Inpatient  Date/Time Action Taken User Additional Information   07/22/23 1217 Release James Jansen MD (auto-released) From Order:[id number]   07/24/23 0655 Complete Kia Love, ARTURO      Order Details    Frequency Duration Priority Order Class   Once 1  occurrence Routine Hospital Performed     Comments    Please arrange for outpatient IV antibiotics with [place] infectious disease consultants with daptomycin 300 mg IV daily, ertapenem 1 g IV daily until 8/22/2023 for abdominal wall abscess, status post drainage 7/22.  Check CBC, CMP, CRP, CPK weekly while on IV antibiotics.  Fax orders to [phone], call [phone] with final arrangements.  Arrange for follow-up with me in 1 week postdischarge.  Weekly PICC dressing changes.            Order Questions    Question Answer   Reason for Consult abx                          Completion  "Info    User Date/Time   Kia Love, RN 07/24/23 0655       Consult Order Info    ID Description Priority Start Date Start Time   [id number] Inpatient Case Management  Consult Routine 07/22/2023 12:18 PM   Provider Specialty Referred to   ______________________________________ _____________________________________                           Acknowledgement Info    For At Acknowledged By Acknowledged On   Placing Order 07/22/23 1217 Laure Canada RN 07/22/23 1224             Reprint Order Requisition    Inpatient Case Management  Consult (Order #[id number]) on 7/22/23       Encounter    View Encounter                  Order Provider Info        Office phone Pager E-mail   Ordering User James Jansen [phone] -- --   Authorizing Provider James Jansen [phone] -- --   Attending Provider Anthony Melara [phone] -- --     Tracking Reports    Cosign Tracking Order Transmittal Tracking                   Date of Birth   1975    Social Security Number       Address   [address] [place] [address] [address] [place] [address]    Home Phone   [phone]    N   [id number]       Tenriism   Unknown    Marital Status                               Admission Date   7/20/23    Admission Type   Urgent    Admitting Provider   Anthony Melara MD    Attending Provider   Anthony Melara MD    Department, Room/Bed   [address], S575/1       Discharge Date       Discharge Disposition   Home or Self Care    Discharge Destination                                 Attending Provider: Anthony Melara MD    Allergies: Chloraseptic Sore Throat [Acetaminophen], Phenol    Isolation: None   Infection: None   Code Status: CPR    Ht: 156.2 cm (61.5\")   Wt: 53.8 kg (118 lb 9.6 oz)    Admission Cmt: None   Principal Problem: Abscess [L02.91]                   Active Insurance as of 7/20/2023       Primary Coverage       Payor Plan Insurance Group Employer/Plan Group "    [facility] [id number]       Payor Plan Address Payor Plan Phone Number Payor Plan Fax Number Effective Dates    [address] [address]   1/1/2022 - None Entered    [facility] [id number]         Subscriber Name Subscriber Birth Date Member ID       DAMIBRAYAN CLAIER 6/13/1971 [id number]                     Emergency Contacts        (Rel.) Home Phone Work Phone Mobile Phone    BRAYAN PARTIDA (Spouse) -- -- [phone]              Insurance Information                  [facility] Phone: --    Subscriber: Brayan Partida Subscriber#: [id number]    Group#: [id number] Precert#: --

## 2023-07-24 NOTE — DISCHARGE PLACEMENT REQUEST
Patricia Castillo (47 y.o. Female)   [facility] office  From [facility]        [address]  [address]  [facility] [address]  Phone:  [phone]  Fax:  [phone]        Patient: ROOM: S[facility]   Patricia Castillo MRN:  [id number]   [address] [place] [address] :  1975  SSN:    Phone: [phone] Sex:  F   PCP: Orly Orta                Emergency Contact Information      Name Relation Home Work Mobile     BRENDA CASTILLO Spouse     [phone]          INSURANCE PAYOR PLAN GROUP # SUBSCRIBER ID   Primary:    [facility] [id number]   Admitting Diagnosis: Abdominal wall abscess [L02.211]  Order Date:  2023        Inpatient Case Management  Consult       (Order ID: [id number])     Diagnosis:         Priority:  Routine Expected Date:   Expiration Date:        Interval:   Count:    Comments: Please arrange for outpatient IV antibiotics with [place] infectious disease consultants with daptomycin 300 mg IV daily, ertapenem 1 g IV daily until 2023 for abdominal wall abscess, status post drainage .  Check CBC, CMP, CRP, CPK weekly while on IV antibiotics.  Fax orders to [phone], call [phone] with final arrangements.  Arrange for follow-up with me in 1 week postdischarge.  Weekly PICC dressing changes.  Also added levofloxacin 750 mg p.o. daily awaiting gram-negative toma identification.  Reason for Consult: abx        Authorizing Provider:James Jansen MD  Authorizing Provider's NPI: [id number]  Order Entered By: James Jansen MD 2023  9:30 AM     Electronically signed by: James Jansen MD 2023  9:30 AM                   Date of Birth   1975    Social Security Number       Address   [address] [place] [address]    Home Phone   [phone]    MRN   [id number]       Bahai   Unknown    Marital Status                               Admission Date  "  7/20/23    Admission Type   Urgent    Admitting Provider   Anthony Melara MD    Attending Provider   Anthony Melara MD    Department, Room/Bed   [address], S575/1       Discharge Date       Discharge Disposition   Home or Self Care    Discharge Destination                                 Attending Provider: Anthony Melara MD    Allergies: Chloraseptic Sore Throat [Acetaminophen], Phenol    Isolation: None   Infection: None   Code Status: CPR    Ht: 156.2 cm (61.5\")   Wt: 53.8 kg (118 lb 9.6 oz)    Admission Cmt: None   Principal Problem: Abscess [L02.91]                   Active Insurance as of 7/20/2023       Primary Coverage       Payor Plan Insurance Group Employer/Plan Group    [facility] [id number]       Payor Plan Address Payor Plan Phone Number Payor Plan Fax Number Effective Dates    [address] [address]   1/1/2022 - None Entered    [facility] [id number]         Subscriber Name Subscriber Birth Date Member ID       BRENDA PARTIDA 6/13/1971 [id number]                     Emergency Contacts        (Rel.) Home Phone Work Phone Mobile Phone    BRENDA PARTIDA (Spouse) -- -- [phone]              Insurance Information                  [facility] Phone: --    Subscriber: Brenda Partida Subscriber#: [id number]    Group#: [id number] Precert#: [id number]          "

## 2023-07-24 NOTE — OUTREACH NOTE
Prep Survey      Flowsheet Row Responses   [facility]? [facility]   Is LACE score < 7 ? No   Eligibility [facility]   Date of Admission 07/20/23   Date of Discharge 07/24/23   Discharge Disposition Home or Self Care   Discharge diagnosis Abdominal wall abscess, Hx appendiceal CA   Does the patient have one of the following disease processes/diagnoses(primary or secondary)? Other   Does the patient have Home health ordered? Yes   What is the Home health agency?  IV abx from [facility], PICC line   Is there a DME ordered? No   Prep survey completed? Yes            Betzy SARMIENTO - Registered Nurse          
admission

## 2023-07-24 NOTE — CASE MANAGEMENT/SOCIAL WORK
Continued Stay Note   Soni     Patient Name: Patricia Castillo  MRN: [id number]  Today's Date: 7/24/2023    Admit Date: 7/20/2023    Plan: home   Discharge Plan       Row Name 07/24/23 0910       Plan    Plan home    Patient/Family in Agreement with Plan yes    Plan Comments I spoke with this patient at the bedside regarding discharge home today. She asked that CM set up [facility] teach and dispense for today for she and her , which I did and they will contact her with the time. CM also made follow up appointment with the [facility] office and gave the date and time to the patient and placed it in AVS. She will go to the [facility] office once weekly for PICC dressing changes and labs.  Her  will transport home.    Final Discharge Disposition Code 01 - home or self-care                   Discharge Codes    No documentation.                 Expected Discharge Date and Time       Expected Discharge Date Expected Discharge Time    Jul 24, 2023               Kia Love RN

## 2023-07-24 NOTE — CASE MANAGEMENT/SOCIAL WORK
Case Management Discharge Note      Final Note: CM spoke with this patient regarding discharge home today. She asked that [facility] [place] by [facility] be contacted for teach and dispense of her IV antibiotics, which I did and they have set up a time today to go to bedside to teach she and her  about infusion process and dispense meds. [facility] follow up appointment has been made and date and time have been given to patient and placed in AVS. Her  will transport. She has no other discharge needs at this time.         Selected Continued Care - Admitted Since 7/20/2023       Destination    No services have been selected for the patient.                Durable Medical Equipment    No services have been selected for the patient.                Dialysis/Infusion       Service Provider Selected Services Address Phone Fax Patient Preferred    [facility] [address], [place] [id number] [phone] --              Home Medical Care    No services have been selected for the patient.                Therapy    No services have been selected for the patient.                Community Resources    No services have been selected for the patient.                Community & DME    No services have been selected for the patient.                         Final Discharge Disposition Code: 01 - home or self-care

## 2023-07-24 NOTE — DISCHARGE INSTR - APPOINTMENTS
Follow up appointment with [facility] Office     Wed, August 2 at 2:00      Please keep your appt wiavinash king in [place]

## 2023-07-24 NOTE — PLAN OF CARE
Goal Outcome Evaluation:  Plan of Care Reviewed With: patient        Progress: improving  Outcome Evaluation: a/o x 4; VSS, RA; up at [facility]; pleasant, no complaints; no acute changes to report; CHG completed; PICC line dressing clean and intact; will continue POC      Problem: Adult Inpatient Plan of Care  Goal: Absence of Hospital-Acquired Illness or Injury  Intervention: Prevent Skin Injury  Recent Flowsheet Documentation  Taken 7/24/2023 0553 by Vito Valdes RN  Body Position: position changed independently  Skin Protection:   adhesive use limited   incontinence pads utilized   transparent dressing maintained   tubing/devices free from skin contact  Taken 7/24/2023 0400 by Vito Valdes RN  Body Position: position changed independently  Skin Protection:   adhesive use limited   tubing/devices free from skin contact   transparent dressing maintained  Taken 7/24/2023 0200 by Vito Valdes RN  Body Position: position changed independently  Skin Protection:   adhesive use limited   tubing/devices free from skin contact   transparent dressing maintained  Taken 7/24/2023 0000 by Vito Valdes RN  Body Position: position changed independently  Skin Protection:   adhesive use limited   transparent dressing maintained   tubing/devices free from skin contact  Taken 7/23/2023 2200 by Vito Valdes RN  Body Position: position changed independently  Skin Protection:   adhesive use limited   transparent dressing maintained   tubing/devices free from skin contact  Taken 7/23/2023 2000 by Vito Valdes RN  Body Position: position changed independently  Skin Protection:   adhesive use limited   transparent dressing maintained   tubing/devices free from skin contact

## 2023-07-25 ENCOUNTER — TRANSITIONAL CARE MANAGEMENT TELEPHONE ENCOUNTER (OUTPATIENT)
Dept: CALL CENTER | Facility: HOSPITAL | Age: 48
End: 2023-07-25
Payer: COMMERCIAL

## 2023-07-25 LAB
BACTERIA FLD CULT: ABNORMAL
GRAM STN SPEC: ABNORMAL
REF LAB TEST METHOD: NORMAL

## 2023-07-25 NOTE — OUTREACH NOTE
Call Center TCM Note      Flowsheet Row Responses   [facility] patient discharged from? [facility]   Does the patient have one of the following disease processes/diagnoses(primary or secondary)? Other   TCM attempt successful? Yes   Call start time 1351   Call end time 1356   Discharge diagnosis Abdominal wall abscess, Hx appendiceal CA   Meds reviewed with patient/caregiver? Yes   Is the patient having any side effects they believe may be caused by any medication additions or changes? No   Does the patient have all medications ordered at discharge? Yes   Is the patient taking all medications as directed (includes completed medication regime)? Yes   Medication comments States she was told not to take Levaquin.   Comments Appt with PCP on 7/31 and 8/2 with ID   Does the patient have an appointment with their PCP within 7-14 days of discharge? Yes   What is the Home health agency?  IV abx from [facility], PICC line   Has home health visited the patient within 72 hours of discharge? Yes   Psychosocial issues? No   Did the patient receive a copy of their discharge instructions? Yes   Nursing interventions Reviewed instructions with patient   What is the patient's perception of their health status since discharge? Improving   Is the patient/caregiver able to teach back signs and symptoms related to disease process for when to call PCP? Yes   Is the patient/caregiver able to teach back signs and symptoms related to disease process for when to call 911? Yes   Is the patient/caregiver able to teach back the hierarchy of who to call/visit for symptoms/problems? PCP, Specialist, Home health nurse, Urgent Care, ED, 911 Yes   If the patient is a current smoker, are they able to teach back resources for cessation? Not a smoker   Additional teach back comments States she has given herself her antibiotics.  She is keeping track of her drainage.   TCM call completed? Yes   Wrap up additional comments Denies  questions or needs at this time.   Call end time 6710            Carole Cardoso LPN    7/25/2023, 13:58 EDT

## 2023-07-26 LAB
BACTERIA SPEC AEROBE CULT: NORMAL
BACTERIA SPEC AEROBE CULT: NORMAL

## 2023-07-29 LAB
FUNGUS WND CULT: NORMAL
MYCOBACTERIUM SPEC CULT: NORMAL
NIGHT BLUE STAIN TISS: NORMAL

## 2023-07-30 LAB — BACTERIA SPEC ANAEROBE CULT: NORMAL

## 2023-08-02 ENCOUNTER — TRANSCRIBE ORDERS (OUTPATIENT)
Dept: LAB | Facility: HOSPITAL | Age: 48
End: 2023-08-02
Payer: COMMERCIAL

## 2023-08-02 ENCOUNTER — LAB (OUTPATIENT)
Dept: LAB | Facility: HOSPITAL | Age: 48
End: 2023-08-02
Payer: COMMERCIAL

## 2023-08-02 ENCOUNTER — READMISSION MANAGEMENT (OUTPATIENT)
Dept: CALL CENTER | Facility: HOSPITAL | Age: 48
End: 2023-08-02
Payer: COMMERCIAL

## 2023-08-02 DIAGNOSIS — L03.311 CELLULITIS OF ABDOMINAL WALL: ICD-10-CM

## 2023-08-02 DIAGNOSIS — B96.29 NON-SHIGA TOXIN-PRODUCING E. COLI: ICD-10-CM

## 2023-08-02 DIAGNOSIS — A42.89 CUTANEOUS ACTINOMYCOTIC INFECTION: ICD-10-CM

## 2023-08-02 DIAGNOSIS — L02.211 ABSCESS OF ABDOMINAL WALL: Primary | ICD-10-CM

## 2023-08-02 DIAGNOSIS — B70.0 MEGALOBLASTIC ANEMIA DUE TO FISH TAPEWORM: ICD-10-CM

## 2023-08-02 DIAGNOSIS — D63.8 MEGALOBLASTIC ANEMIA DUE TO FISH TAPEWORM: ICD-10-CM

## 2023-08-02 DIAGNOSIS — B95.4 BACTERIAL INFECTION DUE TO STREPTOCOCCUS, GROUP G: ICD-10-CM

## 2023-08-02 DIAGNOSIS — L02.211 ABSCESS OF ABDOMINAL WALL: ICD-10-CM

## 2023-08-02 LAB
ALBUMIN SERPL-MCNC: 3.9 G/DL (ref 3.5–5.2)
ALBUMIN/GLOB SERPL: 1.1 G/DL
ALP SERPL-CCNC: 99 U/L (ref 39–117)
ALT SERPL W P-5'-P-CCNC: 18 U/L (ref 1–33)
ANION GAP SERPL CALCULATED.3IONS-SCNC: 10 MMOL/L (ref 5–15)
AST SERPL-CCNC: 22 U/L (ref 1–32)
BASOPHILS # BLD AUTO: 0.03 10*3/MM3 (ref 0–0.2)
BASOPHILS NFR BLD AUTO: 0.5 % (ref 0–1.5)
BILIRUB SERPL-MCNC: <0.2 MG/DL (ref 0–1.2)
BUN SERPL-MCNC: 14 MG/DL (ref 6–20)
BUN/CREAT SERPL: 19.4 (ref 7–25)
CALCIUM SPEC-SCNC: 8.9 MG/DL (ref 8.6–10.5)
CHLORIDE SERPL-SCNC: 103 MMOL/L (ref 98–107)
CK SERPL-CCNC: 58 U/L (ref 20–180)
CO2 SERPL-SCNC: 28 MMOL/L (ref 22–29)
CREAT SERPL-MCNC: 0.72 MG/DL (ref 0.57–1)
CRP SERPL-MCNC: 0.53 MG/DL (ref 0–0.5)
DEPRECATED RDW RBC AUTO: 46.2 FL (ref 37–54)
EGFRCR SERPLBLD CKD-EPI 2021: 102.6 ML/MIN/1.73
EOSINOPHIL # BLD AUTO: 0.18 10*3/MM3 (ref 0–0.4)
EOSINOPHIL NFR BLD AUTO: 2.9 % (ref 0.3–6.2)
ERYTHROCYTE [DISTWIDTH] IN BLOOD BY AUTOMATED COUNT: 15.1 % (ref 12.3–15.4)
GLOBULIN UR ELPH-MCNC: 3.4 GM/DL
GLUCOSE SERPL-MCNC: 83 MG/DL (ref 65–99)
HCT VFR BLD AUTO: 31.6 % (ref 34–46.6)
HGB BLD-MCNC: 9.6 G/DL (ref 12–15.9)
IMM GRANULOCYTES # BLD AUTO: 0.01 10*3/MM3 (ref 0–0.05)
IMM GRANULOCYTES NFR BLD AUTO: 0.2 % (ref 0–0.5)
LYMPHOCYTES # BLD AUTO: 2.25 10*3/MM3 (ref 0.7–3.1)
LYMPHOCYTES NFR BLD AUTO: 35.9 % (ref 19.6–45.3)
MCH RBC QN AUTO: 25.5 PG (ref 26.6–33)
MCHC RBC AUTO-ENTMCNC: 30.4 G/DL (ref 31.5–35.7)
MCV RBC AUTO: 83.8 FL (ref 79–97)
MONOCYTES # BLD AUTO: 0.36 10*3/MM3 (ref 0.1–0.9)
MONOCYTES NFR BLD AUTO: 5.7 % (ref 5–12)
NEUTROPHILS NFR BLD AUTO: 3.44 10*3/MM3 (ref 1.7–7)
NEUTROPHILS NFR BLD AUTO: 54.8 % (ref 42.7–76)
NRBC BLD AUTO-RTO: 0 /100 WBC (ref 0–0.2)
PLATELET # BLD AUTO: 672 10*3/MM3 (ref 140–450)
PMV BLD AUTO: 9 FL (ref 6–12)
POTASSIUM SERPL-SCNC: 4.4 MMOL/L (ref 3.5–5.2)
PROT SERPL-MCNC: 7.3 G/DL (ref 6–8.5)
RBC # BLD AUTO: 3.77 10*6/MM3 (ref 3.77–5.28)
SODIUM SERPL-SCNC: 141 MMOL/L (ref 136–145)
WBC NRBC COR # BLD: 6.27 10*3/MM3 (ref 3.4–10.8)

## 2023-08-02 PROCEDURE — 85025 COMPLETE CBC W/AUTO DIFF WBC: CPT

## 2023-08-02 PROCEDURE — 86140 C-REACTIVE PROTEIN: CPT | Performed by: INTERNAL MEDICINE

## 2023-08-02 PROCEDURE — 36415 COLL VENOUS BLD VENIPUNCTURE: CPT

## 2023-08-02 PROCEDURE — 80053 COMPREHEN METABOLIC PANEL: CPT | Performed by: INTERNAL MEDICINE

## 2023-08-02 PROCEDURE — 82550 ASSAY OF CK (CPK): CPT

## 2023-08-04 ENCOUNTER — PATIENT MESSAGE (OUTPATIENT)
Dept: INTERNAL MEDICINE | Facility: CLINIC | Age: 48
End: 2023-08-04
Payer: COMMERCIAL

## 2023-08-04 DIAGNOSIS — D50.9 IRON DEFICIENCY ANEMIA, UNSPECIFIED IRON DEFICIENCY ANEMIA TYPE: Primary | ICD-10-CM

## 2023-08-05 LAB
FUNGUS WND CULT: NORMAL
MYCOBACTERIUM SPEC CULT: NORMAL
NIGHT BLUE STAIN TISS: NORMAL

## 2023-08-07 RX ORDER — LANOLIN ALCOHOL/MO/W.PET/CERES
325 CREAM (GRAM) TOPICAL
Qty: 90 TABLET | Refills: 3 | Status: SHIPPED | OUTPATIENT
Start: 2023-08-07 | End: 2024-08-06

## 2023-08-09 ENCOUNTER — LAB (OUTPATIENT)
Dept: LAB | Facility: HOSPITAL | Age: 48
End: 2023-08-09
Payer: COMMERCIAL

## 2023-08-09 ENCOUNTER — TRANSCRIBE ORDERS (OUTPATIENT)
Dept: LAB | Facility: HOSPITAL | Age: 48
End: 2023-08-09
Payer: COMMERCIAL

## 2023-08-09 DIAGNOSIS — L03.311 CELLULITIS OF ABDOMINAL WALL: ICD-10-CM

## 2023-08-09 DIAGNOSIS — L02.211 ABSCESS OF ABDOMINAL WALL: ICD-10-CM

## 2023-08-09 DIAGNOSIS — D63.8 CHRONIC DISEASE ANEMIA: ICD-10-CM

## 2023-08-09 DIAGNOSIS — K65.0 ACUTE PERITONITIS: Primary | ICD-10-CM

## 2023-08-09 DIAGNOSIS — B96.29 NON-SHIGA TOXIN-PRODUCING E. COLI: ICD-10-CM

## 2023-08-09 DIAGNOSIS — A42.89 CUTANEOUS ACTINOMYCOTIC INFECTION: ICD-10-CM

## 2023-08-09 DIAGNOSIS — K65.0 ACUTE PERITONITIS: ICD-10-CM

## 2023-08-09 LAB
ALBUMIN SERPL-MCNC: 3.8 G/DL (ref 3.5–5.2)
ALBUMIN/GLOB SERPL: 1.2 G/DL
ALP SERPL-CCNC: 81 U/L (ref 39–117)
ALT SERPL W P-5'-P-CCNC: 18 U/L (ref 1–33)
ANION GAP SERPL CALCULATED.3IONS-SCNC: 10 MMOL/L (ref 5–15)
AST SERPL-CCNC: 26 U/L (ref 1–32)
BASOPHILS # BLD AUTO: 0.03 10*3/MM3 (ref 0–0.2)
BASOPHILS NFR BLD AUTO: 0.6 % (ref 0–1.5)
BILIRUB SERPL-MCNC: <0.2 MG/DL (ref 0–1.2)
BUN SERPL-MCNC: 12 MG/DL (ref 6–20)
BUN/CREAT SERPL: 17.6 (ref 7–25)
CALCIUM SPEC-SCNC: 9.1 MG/DL (ref 8.6–10.5)
CHLORIDE SERPL-SCNC: 105 MMOL/L (ref 98–107)
CK SERPL-CCNC: 52 U/L (ref 20–180)
CO2 SERPL-SCNC: 27 MMOL/L (ref 22–29)
CREAT SERPL-MCNC: 0.68 MG/DL (ref 0.57–1)
CRP SERPL-MCNC: <0.3 MG/DL (ref 0–0.5)
DEPRECATED RDW RBC AUTO: 48.9 FL (ref 37–54)
EGFRCR SERPLBLD CKD-EPI 2021: 106.9 ML/MIN/1.73
EOSINOPHIL # BLD AUTO: 0.15 10*3/MM3 (ref 0–0.4)
EOSINOPHIL NFR BLD AUTO: 2.9 % (ref 0.3–6.2)
ERYTHROCYTE [DISTWIDTH] IN BLOOD BY AUTOMATED COUNT: 15.9 % (ref 12.3–15.4)
GLOBULIN UR ELPH-MCNC: 3.3 GM/DL
GLUCOSE SERPL-MCNC: 80 MG/DL (ref 65–99)
HCT VFR BLD AUTO: 32.9 % (ref 34–46.6)
HGB BLD-MCNC: 10.4 G/DL (ref 12–15.9)
IMM GRANULOCYTES # BLD AUTO: 0.01 10*3/MM3 (ref 0–0.05)
IMM GRANULOCYTES NFR BLD AUTO: 0.2 % (ref 0–0.5)
LYMPHOCYTES # BLD AUTO: 1.99 10*3/MM3 (ref 0.7–3.1)
LYMPHOCYTES NFR BLD AUTO: 38.7 % (ref 19.6–45.3)
MCH RBC QN AUTO: 26.5 PG (ref 26.6–33)
MCHC RBC AUTO-ENTMCNC: 31.6 G/DL (ref 31.5–35.7)
MCV RBC AUTO: 83.7 FL (ref 79–97)
MONOCYTES # BLD AUTO: 0.32 10*3/MM3 (ref 0.1–0.9)
MONOCYTES NFR BLD AUTO: 6.2 % (ref 5–12)
NEUTROPHILS NFR BLD AUTO: 2.64 10*3/MM3 (ref 1.7–7)
NEUTROPHILS NFR BLD AUTO: 51.4 % (ref 42.7–76)
NRBC BLD AUTO-RTO: 0 /100 WBC (ref 0–0.2)
PLATELET # BLD AUTO: 494 10*3/MM3 (ref 140–450)
PMV BLD AUTO: 9.2 FL (ref 6–12)
POTASSIUM SERPL-SCNC: 4.6 MMOL/L (ref 3.5–5.2)
PROT SERPL-MCNC: 7.1 G/DL (ref 6–8.5)
RBC # BLD AUTO: 3.93 10*6/MM3 (ref 3.77–5.28)
SODIUM SERPL-SCNC: 142 MMOL/L (ref 136–145)
WBC NRBC COR # BLD: 5.14 10*3/MM3 (ref 3.4–10.8)

## 2023-08-09 PROCEDURE — 80053 COMPREHEN METABOLIC PANEL: CPT

## 2023-08-09 PROCEDURE — 82550 ASSAY OF CK (CPK): CPT

## 2023-08-09 PROCEDURE — 85025 COMPLETE CBC W/AUTO DIFF WBC: CPT

## 2023-08-09 PROCEDURE — 86140 C-REACTIVE PROTEIN: CPT

## 2023-08-09 PROCEDURE — 36415 COLL VENOUS BLD VENIPUNCTURE: CPT

## 2023-08-16 ENCOUNTER — LAB (OUTPATIENT)
Dept: LAB | Facility: HOSPITAL | Age: 48
End: 2023-08-16
Payer: COMMERCIAL

## 2023-08-16 ENCOUNTER — TRANSCRIBE ORDERS (OUTPATIENT)
Dept: LAB | Facility: HOSPITAL | Age: 48
End: 2023-08-16
Payer: COMMERCIAL

## 2023-08-16 DIAGNOSIS — L03.311 CELLULITIS OF ABDOMINAL WALL: ICD-10-CM

## 2023-08-16 DIAGNOSIS — K65.0 ACUTE PERITONITIS: ICD-10-CM

## 2023-08-16 DIAGNOSIS — B96.29 NON-SHIGA TOXIN-PRODUCING E. COLI: ICD-10-CM

## 2023-08-16 DIAGNOSIS — L02.211 ABSCESS OF ABDOMINAL WALL: ICD-10-CM

## 2023-08-16 DIAGNOSIS — K65.0 ACUTE PERITONITIS: Primary | ICD-10-CM

## 2023-08-16 LAB
ALBUMIN SERPL-MCNC: 4 G/DL (ref 3.5–5.2)
ALBUMIN/GLOB SERPL: 1.1 G/DL
ALP SERPL-CCNC: 73 U/L (ref 39–117)
ALT SERPL W P-5'-P-CCNC: 18 U/L (ref 1–33)
ANION GAP SERPL CALCULATED.3IONS-SCNC: 10 MMOL/L (ref 5–15)
AST SERPL-CCNC: 26 U/L (ref 1–32)
BASOPHILS # BLD AUTO: 0.04 10*3/MM3 (ref 0–0.2)
BASOPHILS NFR BLD AUTO: 0.7 % (ref 0–1.5)
BILIRUB SERPL-MCNC: 0.2 MG/DL (ref 0–1.2)
BUN SERPL-MCNC: 17 MG/DL (ref 6–20)
BUN/CREAT SERPL: 21.3 (ref 7–25)
CALCIUM SPEC-SCNC: 9.3 MG/DL (ref 8.6–10.5)
CHLORIDE SERPL-SCNC: 103 MMOL/L (ref 98–107)
CK SERPL-CCNC: 82 U/L (ref 20–180)
CO2 SERPL-SCNC: 26 MMOL/L (ref 22–29)
CREAT SERPL-MCNC: 0.8 MG/DL (ref 0.57–1)
CRP SERPL-MCNC: 0.35 MG/DL (ref 0–0.5)
DEPRECATED RDW RBC AUTO: 52.5 FL (ref 37–54)
EGFRCR SERPLBLD CKD-EPI 2021: 90.5 ML/MIN/1.73
EOSINOPHIL # BLD AUTO: 0.14 10*3/MM3 (ref 0–0.4)
EOSINOPHIL NFR BLD AUTO: 2.6 % (ref 0.3–6.2)
ERYTHROCYTE [DISTWIDTH] IN BLOOD BY AUTOMATED COUNT: 17 % (ref 12.3–15.4)
ERYTHROCYTE [SEDIMENTATION RATE] IN BLOOD: 10 MM/HR (ref 0–20)
GLOBULIN UR ELPH-MCNC: 3.5 GM/DL
GLUCOSE SERPL-MCNC: 82 MG/DL (ref 65–99)
HCT VFR BLD AUTO: 32.6 % (ref 34–46.6)
HGB BLD-MCNC: 10.2 G/DL (ref 12–15.9)
IMM GRANULOCYTES # BLD AUTO: 0 10*3/MM3 (ref 0–0.05)
IMM GRANULOCYTES NFR BLD AUTO: 0 % (ref 0–0.5)
LYMPHOCYTES # BLD AUTO: 2.11 10*3/MM3 (ref 0.7–3.1)
LYMPHOCYTES NFR BLD AUTO: 38.4 % (ref 19.6–45.3)
MCH RBC QN AUTO: 26.4 PG (ref 26.6–33)
MCHC RBC AUTO-ENTMCNC: 31.3 G/DL (ref 31.5–35.7)
MCV RBC AUTO: 84.2 FL (ref 79–97)
MONOCYTES # BLD AUTO: 0.35 10*3/MM3 (ref 0.1–0.9)
MONOCYTES NFR BLD AUTO: 6.4 % (ref 5–12)
NEUTROPHILS NFR BLD AUTO: 2.85 10*3/MM3 (ref 1.7–7)
NEUTROPHILS NFR BLD AUTO: 51.9 % (ref 42.7–76)
NRBC BLD AUTO-RTO: 0 /100 WBC (ref 0–0.2)
PLATELET # BLD AUTO: 400 10*3/MM3 (ref 140–450)
PMV BLD AUTO: 9.4 FL (ref 6–12)
POTASSIUM SERPL-SCNC: 4.5 MMOL/L (ref 3.5–5.2)
PROT SERPL-MCNC: 7.5 G/DL (ref 6–8.5)
RBC # BLD AUTO: 3.87 10*6/MM3 (ref 3.77–5.28)
SODIUM SERPL-SCNC: 139 MMOL/L (ref 136–145)
WBC NRBC COR # BLD: 5.49 10*3/MM3 (ref 3.4–10.8)

## 2023-08-16 PROCEDURE — 80053 COMPREHEN METABOLIC PANEL: CPT

## 2023-08-16 PROCEDURE — 82550 ASSAY OF CK (CPK): CPT

## 2023-08-16 PROCEDURE — 85652 RBC SED RATE AUTOMATED: CPT

## 2023-08-16 PROCEDURE — 36415 COLL VENOUS BLD VENIPUNCTURE: CPT

## 2023-08-16 PROCEDURE — 86140 C-REACTIVE PROTEIN: CPT

## 2023-08-16 PROCEDURE — 85025 COMPLETE CBC W/AUTO DIFF WBC: CPT

## 2023-08-19 LAB
FUNGUS WND CULT: NORMAL
MYCOBACTERIUM SPEC CULT: NORMAL
NIGHT BLUE STAIN TISS: NORMAL

## 2023-09-02 LAB
FUNGUS WND CULT: NORMAL
MYCOBACTERIUM SPEC CULT: NORMAL
NIGHT BLUE STAIN TISS: NORMAL

## 2023-09-13 ENCOUNTER — LAB (OUTPATIENT)
Dept: LAB | Facility: HOSPITAL | Age: 48
End: 2023-09-13
Payer: COMMERCIAL

## 2023-09-13 ENCOUNTER — TRANSCRIBE ORDERS (OUTPATIENT)
Dept: LAB | Facility: HOSPITAL | Age: 48
End: 2023-09-13
Payer: COMMERCIAL

## 2023-09-13 DIAGNOSIS — L03.311 CELLULITIS OF ABDOMINAL WALL: ICD-10-CM

## 2023-09-13 DIAGNOSIS — K65.0 ACUTE PERITONITIS: ICD-10-CM

## 2023-09-13 DIAGNOSIS — L02.211 ABSCESS OF ABDOMINAL WALL: Primary | ICD-10-CM

## 2023-09-13 DIAGNOSIS — B95.4 BACTERIAL INFECTION DUE TO STREPTOCOCCUS, GROUP G: ICD-10-CM

## 2023-09-13 DIAGNOSIS — K65.0 ACUTE PERITONITIS: Primary | ICD-10-CM

## 2023-09-13 DIAGNOSIS — B96.29 NON-SHIGA TOXIN-PRODUCING E. COLI: ICD-10-CM

## 2023-09-13 DIAGNOSIS — L02.211 ABSCESS OF ABDOMINAL WALL: ICD-10-CM

## 2023-09-13 LAB
ALBUMIN SERPL-MCNC: 3.9 G/DL (ref 3.5–5.2)
ALBUMIN/GLOB SERPL: 1.2 G/DL
ALP SERPL-CCNC: 78 U/L (ref 39–117)
ALT SERPL W P-5'-P-CCNC: 16 U/L (ref 1–33)
ANION GAP SERPL CALCULATED.3IONS-SCNC: 11.1 MMOL/L (ref 5–15)
AST SERPL-CCNC: 20 U/L (ref 1–32)
BASOPHILS # BLD AUTO: 0.01 10*3/MM3 (ref 0–0.2)
BASOPHILS NFR BLD AUTO: 0.2 % (ref 0–1.5)
BILIRUB SERPL-MCNC: <0.2 MG/DL (ref 0–1.2)
BUN SERPL-MCNC: 12 MG/DL (ref 6–20)
BUN/CREAT SERPL: 18.2 (ref 7–25)
CALCIUM SPEC-SCNC: 9.4 MG/DL (ref 8.6–10.5)
CHLORIDE SERPL-SCNC: 104 MMOL/L (ref 98–107)
CO2 SERPL-SCNC: 25.9 MMOL/L (ref 22–29)
CREAT SERPL-MCNC: 0.66 MG/DL (ref 0.57–1)
CRP SERPL-MCNC: 0.84 MG/DL (ref 0–0.5)
DEPRECATED RDW RBC AUTO: 43.1 FL (ref 37–54)
EGFRCR SERPLBLD CKD-EPI 2021: 107.7 ML/MIN/1.73
EOSINOPHIL # BLD AUTO: 0.04 10*3/MM3 (ref 0–0.4)
EOSINOPHIL NFR BLD AUTO: 0.8 % (ref 0.3–6.2)
ERYTHROCYTE [DISTWIDTH] IN BLOOD BY AUTOMATED COUNT: 15 % (ref 12.3–15.4)
GLOBULIN UR ELPH-MCNC: 3.3 GM/DL
GLUCOSE SERPL-MCNC: 76 MG/DL (ref 65–99)
HCT VFR BLD AUTO: 32.9 % (ref 34–46.6)
HGB BLD-MCNC: 10.8 G/DL (ref 12–15.9)
IMM GRANULOCYTES # BLD AUTO: 0.01 10*3/MM3 (ref 0–0.05)
IMM GRANULOCYTES NFR BLD AUTO: 0.2 % (ref 0–0.5)
LYMPHOCYTES # BLD AUTO: 1.56 10*3/MM3 (ref 0.7–3.1)
LYMPHOCYTES NFR BLD AUTO: 29.6 % (ref 19.6–45.3)
MCH RBC QN AUTO: 26.2 PG (ref 26.6–33)
MCHC RBC AUTO-ENTMCNC: 32.8 G/DL (ref 31.5–35.7)
MCV RBC AUTO: 79.9 FL (ref 79–97)
MONOCYTES # BLD AUTO: 0.43 10*3/MM3 (ref 0.1–0.9)
MONOCYTES NFR BLD AUTO: 8.2 % (ref 5–12)
NEUTROPHILS NFR BLD AUTO: 3.22 10*3/MM3 (ref 1.7–7)
NEUTROPHILS NFR BLD AUTO: 61 % (ref 42.7–76)
NRBC BLD AUTO-RTO: 0 /100 WBC (ref 0–0.2)
PLATELET # BLD AUTO: 482 10*3/MM3 (ref 140–450)
PMV BLD AUTO: 10.1 FL (ref 6–12)
POTASSIUM SERPL-SCNC: 4.3 MMOL/L (ref 3.5–5.2)
PROT SERPL-MCNC: 7.2 G/DL (ref 6–8.5)
RBC # BLD AUTO: 4.12 10*6/MM3 (ref 3.77–5.28)
SODIUM SERPL-SCNC: 141 MMOL/L (ref 136–145)
WBC NRBC COR # BLD: 5.27 10*3/MM3 (ref 3.4–10.8)

## 2023-09-13 PROCEDURE — 87077 CULTURE AEROBIC IDENTIFY: CPT

## 2023-09-13 PROCEDURE — 87070 CULTURE OTHR SPECIMN AEROBIC: CPT

## 2023-09-13 PROCEDURE — 86140 C-REACTIVE PROTEIN: CPT

## 2023-09-13 PROCEDURE — 85652 RBC SED RATE AUTOMATED: CPT

## 2023-09-13 PROCEDURE — 80053 COMPREHEN METABOLIC PANEL: CPT

## 2023-09-13 PROCEDURE — 87205 SMEAR GRAM STAIN: CPT

## 2023-09-13 PROCEDURE — 87186 SC STD MICRODIL/AGAR DIL: CPT

## 2023-09-13 PROCEDURE — 36415 COLL VENOUS BLD VENIPUNCTURE: CPT

## 2023-09-13 PROCEDURE — 85025 COMPLETE CBC W/AUTO DIFF WBC: CPT

## 2023-09-14 LAB — ERYTHROCYTE [SEDIMENTATION RATE] IN BLOOD: 21 MM/HR (ref 0–20)

## 2023-09-15 LAB
BACTERIA SPEC AEROBE CULT: ABNORMAL
GRAM STN SPEC: ABNORMAL
GRAM STN SPEC: ABNORMAL

## 2023-10-02 ENCOUNTER — TRANSCRIBE ORDERS (OUTPATIENT)
Dept: LAB | Facility: HOSPITAL | Age: 48
End: 2023-10-02
Payer: COMMERCIAL

## 2023-10-02 ENCOUNTER — LAB (OUTPATIENT)
Dept: LAB | Facility: HOSPITAL | Age: 48
End: 2023-10-02
Payer: COMMERCIAL

## 2023-10-02 DIAGNOSIS — B96.1 BACTEREMIA DUE TO KLEBSIELLA PNEUMONIAE: ICD-10-CM

## 2023-10-02 DIAGNOSIS — L02.211 ABSCESS OF ABDOMINAL WALL: ICD-10-CM

## 2023-10-02 DIAGNOSIS — L03.311 CELLULITIS OF ABDOMINAL WALL: ICD-10-CM

## 2023-10-02 DIAGNOSIS — R78.81 BACTEREMIA DUE TO KLEBSIELLA PNEUMONIAE: Primary | ICD-10-CM

## 2023-10-02 DIAGNOSIS — R78.81 BACTEREMIA DUE TO KLEBSIELLA PNEUMONIAE: ICD-10-CM

## 2023-10-02 DIAGNOSIS — B96.1 BACTEREMIA DUE TO KLEBSIELLA PNEUMONIAE: Primary | ICD-10-CM

## 2023-10-02 LAB
ALBUMIN SERPL-MCNC: 4.1 G/DL (ref 3.5–5.2)
ALBUMIN/GLOB SERPL: 1.3 G/DL
ALP SERPL-CCNC: 65 U/L (ref 39–117)
ALT SERPL W P-5'-P-CCNC: 18 U/L (ref 1–33)
ANION GAP SERPL CALCULATED.3IONS-SCNC: 11 MMOL/L (ref 5–15)
AST SERPL-CCNC: 24 U/L (ref 1–32)
BASOPHILS # BLD AUTO: 0.02 10*3/MM3 (ref 0–0.2)
BASOPHILS NFR BLD AUTO: 0.4 % (ref 0–1.5)
BILIRUB SERPL-MCNC: 0.2 MG/DL (ref 0–1.2)
BUN SERPL-MCNC: 8 MG/DL (ref 6–20)
BUN/CREAT SERPL: 10 (ref 7–25)
CALCIUM SPEC-SCNC: 9.1 MG/DL (ref 8.6–10.5)
CHLORIDE SERPL-SCNC: 104 MMOL/L (ref 98–107)
CO2 SERPL-SCNC: 26 MMOL/L (ref 22–29)
CREAT SERPL-MCNC: 0.8 MG/DL (ref 0.57–1)
CRP SERPL-MCNC: 0.32 MG/DL (ref 0–0.5)
DEPRECATED RDW RBC AUTO: 53.4 FL (ref 37–54)
EGFRCR SERPLBLD CKD-EPI 2021: 90.5 ML/MIN/1.73
EOSINOPHIL # BLD AUTO: 0.07 10*3/MM3 (ref 0–0.4)
EOSINOPHIL NFR BLD AUTO: 1.4 % (ref 0.3–6.2)
ERYTHROCYTE [DISTWIDTH] IN BLOOD BY AUTOMATED COUNT: 17.2 % (ref 12.3–15.4)
GLOBULIN UR ELPH-MCNC: 3.1 GM/DL
GLUCOSE SERPL-MCNC: 83 MG/DL (ref 65–99)
HCT VFR BLD AUTO: 35.8 % (ref 34–46.6)
HGB BLD-MCNC: 11.3 G/DL (ref 12–15.9)
IMM GRANULOCYTES # BLD AUTO: 0.01 10*3/MM3 (ref 0–0.05)
IMM GRANULOCYTES NFR BLD AUTO: 0.2 % (ref 0–0.5)
LYMPHOCYTES # BLD AUTO: 1.48 10*3/MM3 (ref 0.7–3.1)
LYMPHOCYTES NFR BLD AUTO: 30 % (ref 19.6–45.3)
MCH RBC QN AUTO: 26.8 PG (ref 26.6–33)
MCHC RBC AUTO-ENTMCNC: 31.6 G/DL (ref 31.5–35.7)
MCV RBC AUTO: 84.8 FL (ref 79–97)
MONOCYTES # BLD AUTO: 0.56 10*3/MM3 (ref 0.1–0.9)
MONOCYTES NFR BLD AUTO: 11.4 % (ref 5–12)
NEUTROPHILS NFR BLD AUTO: 2.79 10*3/MM3 (ref 1.7–7)
NEUTROPHILS NFR BLD AUTO: 56.6 % (ref 42.7–76)
NRBC BLD AUTO-RTO: 0 /100 WBC (ref 0–0.2)
PLATELET # BLD AUTO: 399 10*3/MM3 (ref 140–450)
PMV BLD AUTO: 9.6 FL (ref 6–12)
POTASSIUM SERPL-SCNC: 4.5 MMOL/L (ref 3.5–5.2)
PROT SERPL-MCNC: 7.2 G/DL (ref 6–8.5)
RBC # BLD AUTO: 4.22 10*6/MM3 (ref 3.77–5.28)
SODIUM SERPL-SCNC: 141 MMOL/L (ref 136–145)
WBC NRBC COR # BLD: 4.93 10*3/MM3 (ref 3.4–10.8)

## 2023-10-02 PROCEDURE — 80053 COMPREHEN METABOLIC PANEL: CPT

## 2023-10-02 PROCEDURE — 36415 COLL VENOUS BLD VENIPUNCTURE: CPT

## 2023-10-02 PROCEDURE — 85025 COMPLETE CBC W/AUTO DIFF WBC: CPT

## 2023-10-02 PROCEDURE — 86140 C-REACTIVE PROTEIN: CPT

## 2023-10-30 ENCOUNTER — TRANSCRIBE ORDERS (OUTPATIENT)
Dept: LAB | Facility: HOSPITAL | Age: 48
End: 2023-10-30
Payer: COMMERCIAL

## 2023-10-30 ENCOUNTER — LAB (OUTPATIENT)
Dept: LAB | Facility: HOSPITAL | Age: 48
End: 2023-10-30
Payer: COMMERCIAL

## 2023-10-30 DIAGNOSIS — L03.311 CELLULITIS OF ABDOMINAL WALL: ICD-10-CM

## 2023-10-30 DIAGNOSIS — R78.81 BACTEREMIA DUE TO KLEBSIELLA PNEUMONIAE: Primary | ICD-10-CM

## 2023-10-30 DIAGNOSIS — R78.81 BACTEREMIA DUE TO KLEBSIELLA PNEUMONIAE: ICD-10-CM

## 2023-10-30 DIAGNOSIS — L02.211 ABSCESS OF ABDOMINAL WALL: ICD-10-CM

## 2023-10-30 DIAGNOSIS — B96.1 BACTEREMIA DUE TO KLEBSIELLA PNEUMONIAE: ICD-10-CM

## 2023-10-30 DIAGNOSIS — B96.1 BACTEREMIA DUE TO KLEBSIELLA PNEUMONIAE: Primary | ICD-10-CM

## 2023-10-30 LAB
ALBUMIN SERPL-MCNC: 4.3 G/DL (ref 3.5–5.2)
ALBUMIN/GLOB SERPL: 1.3 G/DL
ALP SERPL-CCNC: 66 U/L (ref 39–117)
ALT SERPL W P-5'-P-CCNC: 15 U/L (ref 1–33)
ANION GAP SERPL CALCULATED.3IONS-SCNC: 12 MMOL/L (ref 5–15)
AST SERPL-CCNC: 20 U/L (ref 1–32)
BASOPHILS # BLD AUTO: 0.02 10*3/MM3 (ref 0–0.2)
BASOPHILS NFR BLD AUTO: 0.3 % (ref 0–1.5)
BILIRUB SERPL-MCNC: 0.2 MG/DL (ref 0–1.2)
BUN SERPL-MCNC: 11 MG/DL (ref 6–20)
BUN/CREAT SERPL: 12.1 (ref 7–25)
CALCIUM SPEC-SCNC: 9.4 MG/DL (ref 8.6–10.5)
CHLORIDE SERPL-SCNC: 101 MMOL/L (ref 98–107)
CO2 SERPL-SCNC: 27 MMOL/L (ref 22–29)
CREAT SERPL-MCNC: 0.91 MG/DL (ref 0.57–1)
CRP SERPL-MCNC: 0.34 MG/DL (ref 0–0.5)
DEPRECATED RDW RBC AUTO: 48.3 FL (ref 37–54)
EGFRCR SERPLBLD CKD-EPI 2021: 77.5 ML/MIN/1.73
EOSINOPHIL # BLD AUTO: 0.06 10*3/MM3 (ref 0–0.4)
EOSINOPHIL NFR BLD AUTO: 1 % (ref 0.3–6.2)
ERYTHROCYTE [DISTWIDTH] IN BLOOD BY AUTOMATED COUNT: 15.5 % (ref 12.3–15.4)
GLOBULIN UR ELPH-MCNC: 3.3 GM/DL
GLUCOSE SERPL-MCNC: 86 MG/DL (ref 65–99)
HCT VFR BLD AUTO: 38.1 % (ref 34–46.6)
HGB BLD-MCNC: 12.3 G/DL (ref 12–15.9)
IMM GRANULOCYTES # BLD AUTO: 0 10*3/MM3 (ref 0–0.05)
IMM GRANULOCYTES NFR BLD AUTO: 0 % (ref 0–0.5)
LYMPHOCYTES # BLD AUTO: 1.68 10*3/MM3 (ref 0.7–3.1)
LYMPHOCYTES NFR BLD AUTO: 27.6 % (ref 19.6–45.3)
MCH RBC QN AUTO: 28 PG (ref 26.6–33)
MCHC RBC AUTO-ENTMCNC: 32.3 G/DL (ref 31.5–35.7)
MCV RBC AUTO: 86.8 FL (ref 79–97)
MONOCYTES # BLD AUTO: 0.45 10*3/MM3 (ref 0.1–0.9)
MONOCYTES NFR BLD AUTO: 7.4 % (ref 5–12)
NEUTROPHILS NFR BLD AUTO: 3.87 10*3/MM3 (ref 1.7–7)
NEUTROPHILS NFR BLD AUTO: 63.7 % (ref 42.7–76)
NRBC BLD AUTO-RTO: 0 /100 WBC (ref 0–0.2)
PLATELET # BLD AUTO: 456 10*3/MM3 (ref 140–450)
PMV BLD AUTO: 9.1 FL (ref 6–12)
POTASSIUM SERPL-SCNC: 4.2 MMOL/L (ref 3.5–5.2)
PROT SERPL-MCNC: 7.6 G/DL (ref 6–8.5)
RBC # BLD AUTO: 4.39 10*6/MM3 (ref 3.77–5.28)
SODIUM SERPL-SCNC: 140 MMOL/L (ref 136–145)
WBC NRBC COR # BLD: 6.08 10*3/MM3 (ref 3.4–10.8)

## 2023-10-30 PROCEDURE — 36415 COLL VENOUS BLD VENIPUNCTURE: CPT

## 2023-10-30 PROCEDURE — 85025 COMPLETE CBC W/AUTO DIFF WBC: CPT

## 2023-10-30 PROCEDURE — 86140 C-REACTIVE PROTEIN: CPT

## 2023-10-30 PROCEDURE — 80053 COMPREHEN METABOLIC PANEL: CPT

## 2023-12-08 ENCOUNTER — OFFICE VISIT (OUTPATIENT)
Dept: INTERNAL MEDICINE | Facility: CLINIC | Age: 48
End: 2023-12-08
Payer: COMMERCIAL

## 2023-12-08 VITALS
HEART RATE: 72 BPM | WEIGHT: 118 LBS | TEMPERATURE: 98.4 F | OXYGEN SATURATION: 97 % | SYSTOLIC BLOOD PRESSURE: 100 MMHG | DIASTOLIC BLOOD PRESSURE: 66 MMHG | BODY MASS INDEX: 22.28 KG/M2 | HEIGHT: 61 IN

## 2023-12-08 DIAGNOSIS — T81.49XA ABDOMINAL WALL ABSCESS AT SITE OF SURGICAL WOUND: ICD-10-CM

## 2023-12-08 DIAGNOSIS — R51.9 NONINTRACTABLE EPISODIC HEADACHE, UNSPECIFIED HEADACHE TYPE: ICD-10-CM

## 2023-12-08 DIAGNOSIS — C18.1 CANCER OF APPENDIX: ICD-10-CM

## 2023-12-08 DIAGNOSIS — Z00.00 ANNUAL PHYSICAL EXAM: Primary | ICD-10-CM

## 2023-12-08 DIAGNOSIS — Z13.220 SCREENING FOR LIPID DISORDERS: ICD-10-CM

## 2023-12-08 DIAGNOSIS — L65.9 HAIR LOSS: ICD-10-CM

## 2023-12-08 RX ORDER — MULTIPLE VITAMINS W/ MINERALS TAB 9MG-400MCG
1 TAB ORAL DAILY
COMMUNITY

## 2023-12-08 RX ORDER — SUMATRIPTAN 50 MG/1
TABLET, FILM COATED ORAL
Qty: 9 TABLET | Refills: 1 | Status: SHIPPED | OUTPATIENT
Start: 2023-12-08

## 2023-12-08 NOTE — PROGRESS NOTES
Chief Complaint   Patient presents with    Annual Exam       Patricia Castillo is a 47 y.o. female and is here for annual physical exam.     She went through menopause at age 47-year-old. She is not currently sexually active. She has never been tested for sexually transmitted infections. She has been pregnant twice with 2 kids.     She takes calcium, iron, multivitamin, probiotic, and hair, skin, and nail gummies. She does not feel like they are working for her.     She is up to date on her dental exams. She is not up to date on her eye exams. She does not wear contacts or glasses. She walks several days a week with her  and by herself. She is eating a healthy diet. She has never had her thyroid checked.    Since her last cancer surgery in 01/2022, she feels like she is losing a lot of her hair. She has tried a topical medication, but she does not like it because it makes her feel greasy.    She had a headache for 3 days straight, which has not happened in a while. Excedrin Migraine usually works for her when she gets a headache. She has never been diagnosed with migraines, but she thinks she has had them in the past. Her headaches start in the front of her head, on the top of her head, and radiate down into her neck and shoulders. She denies any vision changes or flashing lights. She is not usually sensitive to light or sound. She denies feeling sick to her stomach when this happens. She denies any nasal congestion, earaches, or dizziness with her headaches. She has had a headache since she was a child.    She was in the hospital this summer for an abdominal wall infection. She has a little spot that is not closing up, so she gets her blood drawn every time she goes in to ID. She was on oral antibiotics. She had a drain put in because there was a lot of liquid in there. She had her cancer appointment in [place], and he was able to take her drain out. She was doing well, but when she came home, it got  infected in the little area, and it opened up, and her skin does not want to heal. She has a CT scan scheduled in 2024. She has to go twice a year at [facility] for a CT scan. She has a stitch in her incision that was left after colectomy; supposed to take a very long time to dissolve, and it has never dissolved. It will be 3 years since surgery in 2024.    Health Maintenance   Topic Date Due    HEPATITIS C SCREENING  Never done    ANNUAL PHYSICAL  2024    TDAP/TD VACCINES (2 - Td or Tdap) 2032    COVID-19 Vaccine  Completed    INFLUENZA VACCINE  Completed    Pneumococcal Vaccine 0-64  Aged Out    COLORECTAL CANCER SCREENING  Discontinued       PMH, PSH, SocHx, FamHx, Allergies, and Medications: Reviewed and updated in the Visit Navigator.     Allergies   Allergen Reactions    Chloraseptic Sore Throat [Acetaminophen] Hives    Phenol Hives and Rash     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    Cancer     Appendiceal Cancer    Headache 1985    Heart murmur      Past Surgical History:   Procedure Laterality Date    APPENDECTOMY  2013    Appendix removed cancer found    CHOLECYSTECTOMY  10-1-2013    Removed during cancer debulking surgery    COLON SURGERY  10-1-2014    Partial removal during cancer debulking surgery    COLONOSCOPY  11    HYSTERECTOMY  2013    Full hysterectomy    OOPHORECTOMY      SMALL INTESTINE SURGERY  10-1-2013    Partial removal during cancer debulking & HIPEC    TONSILLECTOMY       Social History     Socioeconomic History    Marital status:    Tobacco Use    Smoking status: Former     Packs/day: 0.25     Years: 1.00     Additional pack years: 0.00     Total pack years: 0.25     Types: Cigarettes     Quit date:      Years since quittin.9    Smokeless tobacco: Never    Tobacco comments:     Smoked for approx a year from age [age]   Vaping Use    Vaping Use: Never used   Substance and Sexual Activity  "   Alcohol use: Not Currently     Comment: Occasional drink    Drug use: Never    Sexual activity: Not Currently     Partners: Male     Birth control/protection: Hysterectomy     Family History   Problem Relation Age of Onset    Hyperlipidemia Mother         Passed from cardiac arrest    Other Mother         Alzheimer’s Disease    Alcohol abuse Sister     Anxiety disorder Sister     Alcohol abuse Paternal Aunt          from alcohol abuse    Other Maternal Grandmother         Alzheimer’s Disease    Cancer Paternal Grandfather         Unsure    Breast cancer Neg Hx        Review of Systems  Review of Systems   All other systems reviewed and are negative.      Objective   /66   Pulse 72   Temp 98.4 °F (36.9 °C)   Ht 156.2 cm (61.5\")   Wt 53.5 kg (118 lb)   SpO2 97%   BMI 21.94 kg/m²   Body mass index is 21.94 kg/m².    Physical Exam  Constitutional:       Appearance: She is well-developed. She is not ill-appearing.   HENT:      Head: Normocephalic.      Right Ear: Tympanic membrane, ear canal and external ear normal.      Left Ear: Tympanic membrane, ear canal and external ear normal.      Nose: Nose normal.      Mouth/Throat:      Mouth: Mucous membranes are moist.      Pharynx: Oropharynx is clear. Uvula midline.   Eyes:      Extraocular Movements: Extraocular movements intact.      Conjunctiva/sclera: Conjunctivae normal.      Pupils: Pupils are equal, round, and reactive to light.   Neck:      Thyroid: No thyromegaly.      Vascular: Carotid bruit present.   Cardiovascular:      Rate and Rhythm: Normal rate and regular rhythm.      Pulses:           Radial pulses are 2+ on the right side and 2+ on the left side.        Dorsalis pedis pulses are 2+ on the right side and 2+ on the left side.      Heart sounds: Normal heart sounds.   Pulmonary:      Effort: Pulmonary effort is normal.      Breath sounds: Normal breath sounds.   Abdominal:      General: Bowel sounds are normal.      Palpations: Abdomen " is soft.      Comments: Small area of erythema, tenderness   Musculoskeletal:         General: No tenderness or deformity. Normal range of motion.      Cervical back: Full passive range of motion without pain, normal range of motion and neck supple.      Right lower leg: No edema.      Left lower leg: No edema.   Lymphadenopathy:      Cervical: No cervical adenopathy.   Skin:     General: Skin is warm.      Capillary Refill: Capillary refill takes less than 2 seconds.   Neurological:      Mental Status: She is alert and oriented to person, place, and time.      Sensory: No sensory deficit.      Coordination: Coordination normal.      Gait: Gait normal.      Comments: CN II-XII normal   Psychiatric:         Attention and Perception: Attention normal.         Mood and Affect: Mood and affect normal.         Speech: Speech normal.         Behavior: Behavior normal.         Thought Content: Thought content normal.         The 10-year CVD risk score (SUNAgoino, et al., 2008) is: 1.6%    Values used to calculate the score:      Age: 47 years      Sex: Female      Diabetic: No      Tobacco smoker: No      Systolic Blood Pressure: 100 mmHg      Is BP treated: No      HDL Cholesterol: 66 mg/dL      Total Cholesterol: 174 mg/dL    Tongue looks a little red and irritated.    Assessment & Plan   1. Healthy female exam.    2. Patient Counseling: Including but not Limited to the following, when appropriate:  --Nutrition: Stressed importance of moderation in sodium/caffeine intake, saturated fat and cholesterol, caloric balance, sufficient intake of fresh fruits, vegetables, fiber, calcium, iron, and 1 mg of folate supplement per day (for females capable of pregnancy).  --Exercise: Stressed the importance of regular exercise.   --Substance Abuse: Discussed cessation/primary prevention of tobacco, alcohol, or other drug use; driving or other dangerous activities under the influence; availability of treatment for abuse, as  indicated based on social history.    --Sexuality: Discussed sexually transmitted diseases, partner selection, use of condoms, avoidance of unintended pregnancy  and contraceptive alternatives.   --Injury prevention: Discussed safety belts, safety helmets, smoke detector, smoking near bedding or upholstery.   --Dental health: Discussed importance of regular tooth brushing, flossing, and dental visits.  --Immunizations reviewed.  --Discussed benefits of colon cancer screening.  3. Discussed the patient's BMI with her.  The BMI is in the acceptable range  4. Return in about 1 year (around 12/8/2024) for Annual.  5. Age-appropriate Screening Scheduled    Assessment & Plan     Diagnoses and all orders for this visit:    1. Annual physical exam (Primary)    2. Abdominal wall abscess at site of surgical wound  -     Hemoglobin A1c    3. Cancer of appendix    4. Nonintractable episodic headache, unspecified headache type  -     SUMAtriptan (Imitrex) 50 MG tablet; Take one tablet at onset of headache. May repeat dose one time in 2 hours if headache not relieved.  Dispense: 9 tablet; Refill: 1.  Symptoms more closely associated with cluster headaches, sumatriptan is recommended as a treatment for those as well as migraines.  - Avoid known triggers when possible  - Stay well-hydrated, drink caffeine when headache starts.  May take ibuprofen, acetaminophen, Excedrin Migraine when headaches began.    5. Hair loss  -     T4, Free  -     T3, Free  -     TSH  -     Thyroid Antibodies    6. Screening for lipid disorders  -     Lipid Panel                  Transcribed from ambient dictation for ELISA Coleman by Kirsten Fernandez.  12/08/23   11:42 EST    Patient or patient representative verbalized consent to the visit recording.  I have personally performed the services described in this document as transcribed by the above individual, and it is both accurate and complete.

## 2023-12-11 ENCOUNTER — TRANSCRIBE ORDERS (OUTPATIENT)
Dept: LAB | Facility: HOSPITAL | Age: 48
End: 2023-12-11
Payer: COMMERCIAL

## 2023-12-11 ENCOUNTER — LAB (OUTPATIENT)
Dept: LAB | Facility: HOSPITAL | Age: 48
End: 2023-12-11
Payer: COMMERCIAL

## 2023-12-11 DIAGNOSIS — R78.81 BACTEREMIA DUE TO KLEBSIELLA PNEUMONIAE: Primary | ICD-10-CM

## 2023-12-11 DIAGNOSIS — R78.81 BACTEREMIA DUE TO KLEBSIELLA PNEUMONIAE: ICD-10-CM

## 2023-12-11 DIAGNOSIS — B96.29 NON-SHIGA TOXIN-PRODUCING E. COLI: ICD-10-CM

## 2023-12-11 DIAGNOSIS — L02.211 ABSCESS OF ABDOMINAL WALL: ICD-10-CM

## 2023-12-11 DIAGNOSIS — L03.311 CELLULITIS OF ABDOMINAL WALL: ICD-10-CM

## 2023-12-11 DIAGNOSIS — B96.1 BACTEREMIA DUE TO KLEBSIELLA PNEUMONIAE: Primary | ICD-10-CM

## 2023-12-11 DIAGNOSIS — L03.311 CELLULITIS OF ABDOMINAL WALL: Primary | ICD-10-CM

## 2023-12-11 DIAGNOSIS — B96.1 BACTEREMIA DUE TO KLEBSIELLA PNEUMONIAE: ICD-10-CM

## 2023-12-11 LAB
ALBUMIN SERPL-MCNC: 4 G/DL (ref 3.5–5.2)
ALBUMIN/GLOB SERPL: 1.2 G/DL
ALP SERPL-CCNC: 76 U/L (ref 39–117)
ALT SERPL W P-5'-P-CCNC: 19 U/L (ref 1–33)
ANION GAP SERPL CALCULATED.3IONS-SCNC: 9 MMOL/L (ref 5–15)
AST SERPL-CCNC: 25 U/L (ref 1–32)
BASOPHILS # BLD AUTO: 0.03 10*3/MM3 (ref 0–0.2)
BASOPHILS NFR BLD AUTO: 0.6 % (ref 0–1.5)
BILIRUB SERPL-MCNC: 0.3 MG/DL (ref 0–1.2)
BUN SERPL-MCNC: 9 MG/DL (ref 6–20)
BUN/CREAT SERPL: 11.5 (ref 7–25)
CALCIUM SPEC-SCNC: 9.4 MG/DL (ref 8.6–10.5)
CHLORIDE SERPL-SCNC: 104 MMOL/L (ref 98–107)
CO2 SERPL-SCNC: 28 MMOL/L (ref 22–29)
CREAT SERPL-MCNC: 0.78 MG/DL (ref 0.57–1)
CRP SERPL-MCNC: 2.16 MG/DL (ref 0–0.5)
DEPRECATED RDW RBC AUTO: 43.8 FL (ref 37–54)
EGFRCR SERPLBLD CKD-EPI 2021: 93.2 ML/MIN/1.73
EOSINOPHIL # BLD AUTO: 0.04 10*3/MM3 (ref 0–0.4)
EOSINOPHIL NFR BLD AUTO: 0.7 % (ref 0.3–6.2)
ERYTHROCYTE [DISTWIDTH] IN BLOOD BY AUTOMATED COUNT: 14 % (ref 12.3–15.4)
GLOBULIN UR ELPH-MCNC: 3.3 GM/DL
GLUCOSE SERPL-MCNC: 82 MG/DL (ref 65–99)
HCT VFR BLD AUTO: 36.6 % (ref 34–46.6)
HGB BLD-MCNC: 11.9 G/DL (ref 12–15.9)
IMM GRANULOCYTES # BLD AUTO: 0.01 10*3/MM3 (ref 0–0.05)
IMM GRANULOCYTES NFR BLD AUTO: 0.2 % (ref 0–0.5)
LYMPHOCYTES # BLD AUTO: 1.37 10*3/MM3 (ref 0.7–3.1)
LYMPHOCYTES NFR BLD AUTO: 25.2 % (ref 19.6–45.3)
MCH RBC QN AUTO: 28.1 PG (ref 26.6–33)
MCHC RBC AUTO-ENTMCNC: 32.5 G/DL (ref 31.5–35.7)
MCV RBC AUTO: 86.5 FL (ref 79–97)
MONOCYTES # BLD AUTO: 0.44 10*3/MM3 (ref 0.1–0.9)
MONOCYTES NFR BLD AUTO: 8.1 % (ref 5–12)
NEUTROPHILS NFR BLD AUTO: 3.54 10*3/MM3 (ref 1.7–7)
NEUTROPHILS NFR BLD AUTO: 65.2 % (ref 42.7–76)
NRBC BLD AUTO-RTO: 0 /100 WBC (ref 0–0.2)
PLATELET # BLD AUTO: 401 10*3/MM3 (ref 140–450)
PMV BLD AUTO: 9.5 FL (ref 6–12)
POTASSIUM SERPL-SCNC: 4.5 MMOL/L (ref 3.5–5.2)
PROT SERPL-MCNC: 7.3 G/DL (ref 6–8.5)
RBC # BLD AUTO: 4.23 10*6/MM3 (ref 3.77–5.28)
SODIUM SERPL-SCNC: 141 MMOL/L (ref 136–145)
WBC NRBC COR # BLD AUTO: 5.43 10*3/MM3 (ref 3.4–10.8)

## 2023-12-11 PROCEDURE — 80053 COMPREHEN METABOLIC PANEL: CPT

## 2023-12-11 PROCEDURE — 85025 COMPLETE CBC W/AUTO DIFF WBC: CPT

## 2023-12-11 PROCEDURE — 86140 C-REACTIVE PROTEIN: CPT

## 2023-12-11 PROCEDURE — 36415 COLL VENOUS BLD VENIPUNCTURE: CPT

## 2023-12-18 ENCOUNTER — OFFICE VISIT (OUTPATIENT)
Dept: INTERNAL MEDICINE | Facility: CLINIC | Age: 48
End: 2023-12-18
Payer: COMMERCIAL

## 2023-12-18 ENCOUNTER — HOSPITAL ENCOUNTER (OUTPATIENT)
Dept: GENERAL RADIOLOGY | Facility: HOSPITAL | Age: 48
Discharge: HOME | End: 2023-12-18
Admitting: STUDENT IN AN ORGANIZED HEALTH CARE EDUCATION/TRAINING PROGRAM
Payer: COMMERCIAL

## 2023-12-18 VITALS
OXYGEN SATURATION: 95 % | HEIGHT: 61 IN | WEIGHT: 115 LBS | BODY MASS INDEX: 21.71 KG/M2 | DIASTOLIC BLOOD PRESSURE: 70 MMHG | SYSTOLIC BLOOD PRESSURE: 100 MMHG | TEMPERATURE: 97.5 F | HEART RATE: 105 BPM

## 2023-12-18 DIAGNOSIS — R10.11 RIGHT UPPER QUADRANT ABDOMINAL PAIN: ICD-10-CM

## 2023-12-18 DIAGNOSIS — K52.9 COLITIS: Primary | ICD-10-CM

## 2023-12-18 DIAGNOSIS — K52.9 COLITIS: ICD-10-CM

## 2023-12-18 PROCEDURE — 99213 OFFICE O/P EST LOW 20 MIN: CPT | Performed by: STUDENT IN AN ORGANIZED HEALTH CARE EDUCATION/TRAINING PROGRAM

## 2023-12-18 PROCEDURE — 74018 RADEX ABDOMEN 1 VIEW: CPT

## 2023-12-18 RX ORDER — LEVOFLOXACIN 750 MG/1
750 TABLET, FILM COATED ORAL DAILY
Qty: 10 TABLET | Refills: 0 | Status: SHIPPED | OUTPATIENT
Start: 2023-12-18

## 2023-12-18 RX ORDER — METRONIDAZOLE 500 MG/1
500 TABLET ORAL 3 TIMES DAILY
Qty: 30 TABLET | Refills: 0 | Status: SHIPPED | OUTPATIENT
Start: 2023-12-18

## 2023-12-18 NOTE — PROGRESS NOTES
Subjective   Patricia Castillo is a 47 y.o. female.     History of Present Illness  Patient presents with 7 days of right upper quadrant pain.  She has had gallbladder removed.  She denies nausea vomiting diarrhea.  She denies fever or chills.  She denies constipation.  She denies any changes in bowel movements.  She has history of appendiceal cancer SP colectomy.  She also about 4 5 months ago had an abdominal wall abscess that had to have a drain tube placed and has been following with infectious disease.  On the day of the start of her abdominal pain she had a CRP checked that was 2.51.      The following portions of the patient's history were reviewed and updated as appropriate: allergies, current medications, past family history, past medical history, past social history, past surgical history, and problem list.    Review of Systems   All other systems reviewed and are negative.      Objective   Physical Exam  Vitals and nursing note reviewed.   Constitutional:       Appearance: Normal appearance.   HENT:      Head: Normocephalic and atraumatic.      Right Ear: External ear normal.      Left Ear: External ear normal.      Nose: Nose normal.      Mouth/Throat:      Mouth: Mucous membranes are moist.      Pharynx: Oropharynx is clear. No oropharyngeal exudate or posterior oropharyngeal erythema.   Eyes:      Extraocular Movements: Extraocular movements intact.      Conjunctiva/sclera: Conjunctivae normal.      Pupils: Pupils are equal, round, and reactive to light.   Cardiovascular:      Rate and Rhythm: Regular rhythm.      Pulses: Normal pulses.      Heart sounds: Normal heart sounds.   Pulmonary:      Effort: Pulmonary effort is normal.      Breath sounds: Normal breath sounds.   Abdominal:      General: Abdomen is flat. Bowel sounds are normal.      Palpations: Abdomen is soft.   Musculoskeletal:         General: Normal range of motion.      Cervical back: Normal range of motion.   Skin:     General: Skin is  warm.      Capillary Refill: Capillary refill takes less than 2 seconds.   Neurological:      General: No focal deficit present.      Mental Status: She is alert and oriented to person, place, and time. Mental status is at baseline.   Psychiatric:         Mood and Affect: Mood normal.         Behavior: Behavior normal.         Thought Content: Thought content normal.         Judgment: Judgment normal.         Assessment & Plan   Diagnoses and all orders for this visit:    1. Colitis (Primary)  -     XR Abdomen KUB; Future  -     levoFLOXacin (Levaquin) 750 MG tablet; Take 1 tablet by mouth Daily.  Dispense: 10 tablet; Refill: 0  -     metroNIDAZOLE (Flagyl) 500 MG tablet; Take 1 tablet by mouth 3 (Three) Times a Day.  Dispense: 30 tablet; Refill: 0    2. Right upper quadrant abdominal pain  -     XR Abdomen KUB; Future       Opening from patient's prior abdominal drain is still present but has no redness induration or warmth and no signs of infection.  Due to patient's abdominal history we will go ahead and treat this like colitis with Levaquin Flagyl.  Patient counseled if abdominal pain returns diffuse and generalized or she starts to have fevers that she needs to go directly to the ER            My symptoms get worse or I have questions about my medication

## 2023-12-19 LAB
CHOLEST SERPL-MCNC: 174 MG/DL (ref 0–200)
HBA1C MFR BLD: 5.5 % (ref 4.8–5.6)
HDLC SERPL-MCNC: 66 MG/DL (ref 40–60)
LDLC SERPL CALC-MCNC: 94 MG/DL (ref 0–100)
T3FREE SERPL-MCNC: 2.6 PG/ML (ref 2–4.4)
T4 FREE SERPL-MCNC: 1.05 NG/DL (ref 0.93–1.7)
THYROGLOB AB SERPL-ACNC: <1 IU/ML (ref 0–0.9)
THYROPEROXIDASE AB SERPL-ACNC: <9 IU/ML (ref 0–34)
TRIGL SERPL-MCNC: 72 MG/DL (ref 0–150)
TSH SERPL DL<=0.005 MIU/L-ACNC: 0.92 UIU/ML (ref 0.27–4.2)
VLDLC SERPL CALC-MCNC: 14 MG/DL (ref 5–40)

## 2023-12-29 ENCOUNTER — PATIENT MESSAGE (OUTPATIENT)
Dept: INTERNAL MEDICINE | Facility: CLINIC | Age: 48
End: 2023-12-29
Payer: COMMERCIAL

## 2023-12-29 DIAGNOSIS — L65.9 ALOPECIA: Primary | ICD-10-CM

## 2023-12-30 RX ORDER — MINOXIDIL 2.5 MG/1
2.5 TABLET ORAL DAILY
Qty: 30 TABLET | Refills: 1 | Status: SHIPPED | OUTPATIENT
Start: 2023-12-30

## 2023-12-30 NOTE — TELEPHONE ENCOUNTER
From: Patricia Orellana  To: Orly Orta  Sent: 12/29/2023 12:54 PM EST  Subject: Hair growth supplement    Good afternoon Dr Orta,  Upon seeing the results of my thyroid being in the normal range, I’m inquiring about getting a prescription for an oral supplement in regards to hair growth and strengthening. Thank you in advance.  Happy Holidays~  Patricia orellana

## 2024-02-19 DIAGNOSIS — L65.9 ALOPECIA: ICD-10-CM

## 2024-02-19 RX ORDER — MINOXIDIL 2.5 MG/1
2.5 TABLET ORAL DAILY
Qty: 90 TABLET | Refills: 1 | Status: SHIPPED | OUTPATIENT
Start: 2024-02-19

## 2024-06-26 ENCOUNTER — TELEPHONE (OUTPATIENT)
Dept: INTERNAL MEDICINE | Facility: CLINIC | Age: 49
End: 2024-06-26
Payer: COMMERCIAL

## 2024-06-26 NOTE — TELEPHONE ENCOUNTER
Pt called and stated she is having redness/irritation/leaking from belly button/stomach pain/hardness around stomach and thinks its an infection. She stated she has had something like this before. She is due to have a CT scan in August but doesn't think she can wait that long. I relayed for her to go to the ER but she states that they never do anything for her and wouldn't do a CT scan. Please advise.

## 2024-06-26 NOTE — TELEPHONE ENCOUNTER
Spoke with . She has not had a fever. I recommended ER. He asked if we have any appts. tomm. I told him I don't see any, but we may have same day availability tomm. If so, call about 8 am tomm. Again advised ER though.

## 2024-07-03 ENCOUNTER — READMISSION MANAGEMENT (OUTPATIENT)
Dept: CALL CENTER | Facility: HOSPITAL | Age: 49
End: 2024-07-03
Payer: COMMERCIAL

## 2024-07-03 NOTE — OUTREACH NOTE
Prep Survey      Flowsheet Row Responses   [facility]? [facility]   Is LACE score < 7 ? Non-BH Discharge   Eligibility [facility]   Date of Admission 06/26/24   Date of Discharge 07/03/24   Discharge diagnosis Fistula (Primary Dx),  Carcinoma of appendix (CMS/HCC)   Does the patient have one of the following disease processes/diagnoses(primary or secondary)? Other   Prep survey completed? Yes            Sarah HOANG - Registered Nurse

## 2024-07-05 ENCOUNTER — TRANSITIONAL CARE MANAGEMENT TELEPHONE ENCOUNTER (OUTPATIENT)
Dept: CALL CENTER | Facility: HOSPITAL | Age: 49
End: 2024-07-05
Payer: COMMERCIAL

## 2024-07-05 NOTE — OUTREACH NOTE
Call Center TCM Note      Flowsheet Row Responses   [facility] patient discharged from? Non-   Does the patient have one of the following disease processes/diagnoses(primary or secondary)? Other   TCM attempt successful? Yes  [VR listing B[facility]]   Call start time 1632   Call end time 1639   Discharge diagnosis Fistula (Primary Dx),  Carcinoma of appendix (CMS/HCC)   Medication alerts for this patient PICC for IV Zosyn infusion over 24hrs per pt. PICC care done weekly at [facility]   Meds reviewed with patient/caregiver? Yes   Is the patient having any side effects they believe may be caused by any medication additions or changes? No   Is the patient taking all medications as directed (includes completed medication regime)? Yes   Comments Pt is eligible for TCM f/u appt within 14 days of DC   Does the patient have an appointment with their PCP within 7-14 days of discharge? No   Nursing Interventions Patient desires to follow up with specialty only   Comments Pt reports improving, no issues tolerating po intake, voiding or fevers. Pt reports no issues at this time.   TCM call completed? Yes   Call end time 1639            Autumn Lloyd RN    7/5/2024, 16:39 EDT

## 2024-07-25 DIAGNOSIS — D50.9 IRON DEFICIENCY ANEMIA, UNSPECIFIED IRON DEFICIENCY ANEMIA TYPE: ICD-10-CM

## 2024-07-25 RX ORDER — FERROUS SULFATE 325(65) MG
1 TABLET, DELAYED RELEASE (ENTERIC COATED) ORAL
Qty: 90 TABLET | Refills: 3 | Status: SHIPPED | OUTPATIENT
Start: 2024-07-25

## 2024-08-08 DIAGNOSIS — L65.9 ALOPECIA: ICD-10-CM

## 2024-08-09 RX ORDER — MINOXIDIL 2.5 MG/1
2.5 TABLET ORAL DAILY
Qty: 90 TABLET | Refills: 1 | Status: SHIPPED | OUTPATIENT
Start: 2024-08-09

## 2024-11-07 DIAGNOSIS — R51.9 NONINTRACTABLE EPISODIC HEADACHE, UNSPECIFIED HEADACHE TYPE: ICD-10-CM

## 2024-11-07 RX ORDER — SUMATRIPTAN 50 MG/1
TABLET, FILM COATED ORAL
Qty: 9 TABLET | Refills: 1 | Status: SHIPPED | OUTPATIENT
Start: 2024-11-07

## 2025-01-31 ENCOUNTER — OFFICE VISIT (OUTPATIENT)
Dept: INTERNAL MEDICINE | Facility: CLINIC | Age: 50
End: 2025-01-31
Payer: COMMERCIAL

## 2025-01-31 VITALS
TEMPERATURE: 98.7 F | DIASTOLIC BLOOD PRESSURE: 68 MMHG | HEIGHT: 61 IN | BODY MASS INDEX: 21.49 KG/M2 | SYSTOLIC BLOOD PRESSURE: 104 MMHG | WEIGHT: 113.8 LBS | HEART RATE: 86 BPM | OXYGEN SATURATION: 96 %

## 2025-01-31 DIAGNOSIS — L65.9 ALOPECIA: ICD-10-CM

## 2025-01-31 DIAGNOSIS — Z00.00 ENCOUNTER FOR ANNUAL PHYSICAL EXAMINATION EXCLUDING GYNECOLOGICAL EXAMINATION IN A PATIENT OLDER THAN 17 YEARS: Primary | ICD-10-CM

## 2025-01-31 DIAGNOSIS — C18.1 CANCER OF APPENDIX: ICD-10-CM

## 2025-01-31 DIAGNOSIS — F32.0 CURRENT MILD EPISODE OF MAJOR DEPRESSIVE DISORDER WITHOUT PRIOR EPISODE: ICD-10-CM

## 2025-01-31 DIAGNOSIS — L02.211 ABDOMINAL WALL ABSCESS: ICD-10-CM

## 2025-01-31 DIAGNOSIS — R51.9 NONINTRACTABLE EPISODIC HEADACHE, UNSPECIFIED HEADACHE TYPE: ICD-10-CM

## 2025-01-31 DIAGNOSIS — F41.9 ANXIETY: ICD-10-CM

## 2025-01-31 DIAGNOSIS — Z12.31 ENCOUNTER FOR SCREENING MAMMOGRAM FOR MALIGNANT NEOPLASM OF BREAST: ICD-10-CM

## 2025-01-31 DIAGNOSIS — R45.86 MOOD SWINGS: ICD-10-CM

## 2025-01-31 PROCEDURE — 99396 PREV VISIT EST AGE 40-64: CPT | Performed by: NURSE PRACTITIONER

## 2025-01-31 RX ORDER — SUMATRIPTAN 50 MG/1
TABLET, FILM COATED ORAL
Qty: 9 TABLET | Refills: 5 | Status: SHIPPED | OUTPATIENT
Start: 2025-01-31

## 2025-01-31 RX ORDER — BUPROPION HYDROCHLORIDE 150 MG/1
150 TABLET ORAL DAILY
Qty: 30 TABLET | Refills: 1 | Status: SHIPPED | OUTPATIENT
Start: 2025-01-31

## 2025-01-31 RX ORDER — MINOXIDIL 2.5 MG/1
2.5 TABLET ORAL DAILY
Qty: 90 TABLET | Refills: 3 | Status: SHIPPED | OUTPATIENT
Start: 2025-01-31

## 2025-01-31 NOTE — PROGRESS NOTES
Chief Complaint   Patient presents with    Annual Exam       Patricia Castillo is a 49 y.o. female and is here for a comprehensive physical exam.     Metastatic appendiceal cancer diagnosed in 2013, seen at [facility] for prior surgical intervention.  Abdominal visceral abscess treated with IV antibiotics and I&D in the past. Mucin builds up, causes more problems than tumors that are present.  Hopes to start new medication that can help with mucin build up, to be seen at [facility] on 2/17/25.    Anxious, depression.  Mood changes rapidly some days.  Her sister is bipolar.  Believes her father was bipolar, never tested or on medication for it.    Questionnaire answers for bipolar disorder:   Has there ever been a period of time when you were not your usual self and…  …you felt so good or so hyper that other people thought you were not your   normal self or you were so hyper that you got into trouble? NO  …you were so irritable that you shouted at people or started fights or arguments? YES with spouse  …you felt much more self-confident than usual? No   …you got much less sleep than usual and found you didn’t really miss it? No   …you were much more talkative or spoke faster than usual? Sometimes   …thoughts raced through your head or you couldn’t slow your mind down? Sometimes   …you were so easily distracted by things around you that you had trouble   concentrating or staying on track? Yes   …you had much more energy than usual? No   …you were much more active or did many more things than usual? No   …you were much more social or outgoing than usual, for example, you   telephoned friends in the middle of the night? No   …you were much more interested in sex than usual? no  …you did things that were unusual for you or that other people might have   thought were excessive, foolish, or risky? No   …spending money got you or your family in trouble?no   2. If you checked YES to more than one of the above, have  several of these ever   happened during the same period of time? Please check 1 response only. yes  3. How much of a problem did any of these cause you -- like being able to work;   having family, money, or legal troubles; getting into arguments or fights?   Please check 1 response only. Minor   No problem Minor problem Moderate problem Serious problem   4. Have any of your blood relatives (ie, children, siblings, parents, grandparents,   aunts, uncles) had manic-depressive illness or bipolar disorder? Yes, her sister       History:  LMP: No LMP recorded. Patient has had a hysterectomy.  Menopause at 37 years  Sexual activity:  not currently   : 2  Para: 2    Do you take any herbs or supplements that were not prescribed by a doctor? yes    Health Habits:  Dental Exam. up to date, brushes twice a day  Eye Exam. not up to date - does not wear corrective lenses  Exercise: 0 times/week.  Current exercise activities include: none    Health Maintenance   Topic Date Due    MAMMOGRAM  2025    ANNUAL PHYSICAL  2026    TDAP/TD VACCINES (2 - Td or Tdap) 2032    HEPATITIS C SCREENING  Completed    COVID-19 Vaccine  Completed    INFLUENZA VACCINE  Completed    ZOSTER VACCINE  Completed    Pneumococcal Vaccine 0-64  Aged Out    COLORECTAL CANCER SCREENING  Discontinued       PMH, PSH, SocHx, FamHx, Allergies, and Medications: Reviewed and updated in the Visit Navigator.     Allergies   Allergen Reactions    Oxycodone-Acetaminophen Other (See Comments)    Phenol Hives and Rash    Albolene Unknown - Low Severity    Hydrocodone-Acetaminophen Other (See Comments)    Chloraseptic Sore Throat [Acetaminophen] Hives     Past Medical History:   Diagnosis Date    Allergic     Chloraseptic-hives    Cancer     Appendiceal Cancer    Depression     Headache 1985    Heart murmur 1988    History of medical problems 21 &     Admitted into [facility] for an obdominal wall infection- for  "drain insert, fever, and antibiotics were all included in the hospital stay.     Past Surgical History:   Procedure Laterality Date    APPENDECTOMY  2013    Appendix removed cancer found    CHOLECYSTECTOMY  10-1-2013    Removed during cancer debulking surgery    COLON SURGERY  10-1-2014    Partial removal during cancer debulking surgery    COLONOSCOPY  11    HYSTERECTOMY  2013    Full hysterectomy    OOPHORECTOMY      SMALL INTESTINE SURGERY  10-1-2013    Partial removal during cancer debulking & HIPEC    TONSILLECTOMY       Social History     Socioeconomic History    Marital status:    Tobacco Use    Smoking status: Former     Current packs/day: 0.00     Average packs/day: 0.3 packs/day for 1.2 years (0.3 ttl pk-yrs)     Types: Cigarettes     Start date:      Quit date:      Years since quittin.    Smokeless tobacco: Never    Tobacco comments:     Smoked for approx a year from age [age]   Vaping Use    Vaping status: Never Used   Substance and Sexual Activity    Alcohol use: Not Currently     Comment: Occasional drink    Drug use: Never    Sexual activity: Not Currently     Partners: Male     Birth control/protection: Hysterectomy     Family History   Problem Relation Age of Onset    Hyperlipidemia Mother         Passed from cardiac arrest    Alcohol abuse Sister     Anxiety disorder Sister     Alcohol abuse Paternal Aunt          from alcohol abuse, liver failure    Cancer Paternal Grandfather         Unsure    Breast cancer Neg Hx        Review of Systems  Review of Systems   All other systems reviewed and are negative.      Objective   /68   Pulse 86   Temp 98.7 °F (37.1 °C)   Ht 156.2 cm (61.5\")   Wt 51.6 kg (113 lb 12.8 oz)   SpO2 96%   BMI 21.16 kg/m²   Body mass index is 21.16 kg/m².    Physical Exam  Constitutional:       Appearance: She is well-developed. She is not ill-appearing.   HENT:      Head: Normocephalic.      Right Ear: Tympanic membrane, ear " canal and external ear normal.      Left Ear: Tympanic membrane, ear canal and external ear normal.      Nose: Nose normal.      Mouth/Throat:      Mouth: Mucous membranes are moist.      Pharynx: Oropharynx is clear. Uvula midline.   Eyes:      Extraocular Movements: Extraocular movements intact.      Conjunctiva/sclera: Conjunctivae normal.      Pupils: Pupils are equal, round, and reactive to light.   Neck:      Thyroid: No thyromegaly.   Cardiovascular:      Rate and Rhythm: Normal rate and regular rhythm.      Pulses:           Radial pulses are 2+ on the right side and 2+ on the left side.        Dorsalis pedis pulses are 2+ on the right side and 2+ on the left side.      Heart sounds: Normal heart sounds.   Pulmonary:      Effort: Pulmonary effort is normal.      Breath sounds: Normal breath sounds.   Abdominal:      General: Bowel sounds are normal.      Palpations: Abdomen is soft.      Tenderness: There is no abdominal tenderness.   Musculoskeletal:         General: No tenderness or deformity. Normal range of motion.      Cervical back: Full passive range of motion without pain, normal range of motion and neck supple.      Right lower leg: No edema.      Left lower leg: No edema.   Lymphadenopathy:      Cervical: No cervical adenopathy.   Skin:     General: Skin is warm.      Capillary Refill: Capillary refill takes less than 2 seconds.   Neurological:      Mental Status: She is alert and oriented to person, place, and time.      Sensory: No sensory deficit.      Coordination: Coordination normal.      Gait: Gait normal.      Comments: CN II-XII normal   Psychiatric:         Attention and Perception: Attention normal.         Mood and Affect: Mood and affect normal.         Speech: Speech normal.         Behavior: Behavior normal.         Thought Content: Thought content normal.         The 10-year CVD risk score (D'Agostino, et al., 2008) is: 2%    Values used to calculate the score:      Age: 49 years       Sex: Female      Diabetic: No      Tobacco smoker: No      Systolic Blood Pressure: 104 mmHg      Is BP treated: No      HDL Cholesterol: 66 mg/dL      Total Cholesterol: 174 mg/dL      Assessment & Plan   1. Healthy female exam.    2. Patient Counseling: Including but not Limited to the following, when appropriate:  --Nutrition: Stressed importance of moderation in sodium/caffeine intake, saturated fat and cholesterol, caloric balance, sufficient intake of fresh fruits, vegetables, fiber, calcium, iron, and 1 mg of folate supplement per day (for females capable of pregnancy).  --Exercise: Stressed the importance of regular exercise.   --Substance Abuse: Discussed cessation/primary prevention of tobacco, alcohol, or other drug use; driving or other dangerous activities under the influence; availability of treatment for abuse, as indicated based on social history.    --Sexuality: Discussed sexually transmitted diseases, partner selection, use of condoms, avoidance of unintended pregnancy  and contraceptive alternatives.   --Injury prevention: Discussed safety belts, safety helmets, smoke detector, smoking near bedding or upholstery.   --Dental health: Discussed importance of regular tooth brushing, flossing, and dental visits.  --Immunizations reviewed.  --Discussed benefits of colon cancer screening.  3. Discussed the patient's BMI with her.  The BMI is in the acceptable range  4. Return in about 4 weeks (around 2/28/2025) for Next scheduled follow up.  5. Age-appropriate Screening Scheduled    Assessment & Plan     Diagnoses and all orders for this visit:    1. Encounter for annual physical examination excluding gynecological examination in a patient older than 17 years (Primary)    2. Current mild episode of major depressive disorder without prior episode  -     buPROPion XL (Wellbutrin XL) 150 MG 24 hr tablet; Take 1 tablet by mouth Daily.  Dispense: 30 tablet; Refill: 1  3. Mood swings  -     buPROPion XL  (Wellbutrin XL) 150 MG 24 hr tablet; Take 1 tablet by mouth Daily.  Dispense: 30 tablet; Refill: 1  4. Anxiety        - Encouraged to take part in daily physical exercise.          - Eat healthy, well balanced diet; avoid sugary foods or beverages        - Continue to abstain from alcohol and drugs         - Ensure good night's sleep by creating calm space in bedroom, avoiding screen time 1-2 hours before bed, no caffeine after 5 pm        - Talk to supportive family and friends, as needed        - Consider journaling or other creative way to express feelings, if needed    5. Alopecia  -     minoxidil (LONITEN) 2.5 MG tablet; Take 1 tablet by mouth Daily.  Dispense: 90 tablet; Refill: 3    6. Nonintractable episodic headache, unspecified headache type  -     SUMAtriptan (IMITREX) 50 MG tablet; Take one tablet at onset of headache. May repeat dose one time in 2 hours if headache not relieved.  Dispense: 9 tablet; Refill: 5  - Avoid known triggers when possible    7. Abdominal wall abscess    8. Encounter for screening mammogram for malignant neoplasm of breast  -     Mammo screening digital tomosynthesis bilateral w CAD; Future    9. Cancer of appendix       - Follow up with Oncology as scheduled.

## 2025-02-28 ENCOUNTER — TELEMEDICINE (OUTPATIENT)
Dept: INTERNAL MEDICINE | Facility: CLINIC | Age: 50
End: 2025-02-28
Payer: COMMERCIAL

## 2025-02-28 VITALS — HEIGHT: 61 IN | BODY MASS INDEX: 21.16 KG/M2

## 2025-02-28 DIAGNOSIS — R45.86 MOOD SWINGS: ICD-10-CM

## 2025-02-28 DIAGNOSIS — F32.0 CURRENT MILD EPISODE OF MAJOR DEPRESSIVE DISORDER WITHOUT PRIOR EPISODE: ICD-10-CM

## 2025-02-28 PROCEDURE — 99213 OFFICE O/P EST LOW 20 MIN: CPT | Performed by: NURSE PRACTITIONER

## 2025-02-28 RX ORDER — BUPROPION HYDROCHLORIDE 150 MG/1
150 TABLET ORAL DAILY
Qty: 30 TABLET | Refills: 1 | Status: SHIPPED | OUTPATIENT
Start: 2025-02-28 | End: 2025-03-04 | Stop reason: SDUPTHER

## 2025-02-28 NOTE — PROGRESS NOTES
Mode of Visit: Video  Location of patient: -HOME-  Location of provider: +[facility] [place] [facility]+  You have chosen to receive care through a telehealth visit.  The patient has signed the video visit consent form.  The visit included audio and video interaction. No technical issues occurred during this visit.  Patient or patient representative verbalizes consent for the use of ambient listening during the visit with ELISA Alexander, for documentation. Efforts were made to edit dictation, but occasionally words are mistranscribed.     Date: 2025  Name: Patricia Castillo  : 1975         Chief Complaint:   Chief Complaint   Patient presents with    Anxiety    Depression    Migraine    Alopecia     History of Present Illness  Patricia Castillo is a 49 y.o. female presents for video visit in regard to depression.    She has been on Wellbutrin for just under a month, reporting a slight improvement in her symptoms. She did not get the prescription for the first few days as the pharmacy was out of it and they had to order it. She resumed her exercise regimen, specifically walking, on the day of her last consultation, which she believes has also contributed positively to her mental health. She is uncertain about the potential benefits of increasing her medication dosage. She reports no side effects such as headaches, insomnia, or tinnitus, but does experience mild dry mouth, which she manages by increasing her water intake.    She recently visited [facility], where it was confirmed that her tumors remain stable with no signs of progression.          History: The following portions of the patient's history were reviewed and updated as appropriate: allergies, current medications, past medical history, family history, surgical history, social history and problem list.      ROS:  Review of Systems      PE:  Physical Exam   Constitutional: She appears well-developed and well-nourished. No distress.   HENT:   Head:  Normocephalic.   Right Ear: External ear normal.   Left Ear: External ear normal.   Eyes: Pupils are equal, round, and reactive to light. Conjunctivae are normal.   Neck: Neck normal appearance.  Pulmonary/Chest: Effort normal.   Neurological: She is alert.   Oriented x 3   Skin: No pallor.   Psychiatric: She has a normal mood and affect. Her speech is normal and behavior is normal. Thought content normal. Her affect is normal.          Assessment/Plan:  Diagnoses and all orders for this visit:    1. Current mild episode of major depressive disorder without prior episode  -     buPROPion XL (Wellbutrin XL) 150 MG 24 hr tablet; Take 1 tablet by mouth Daily.  Dispense: 30 tablet; Refill: 1  2. Mood swings  -     buPROPion XL (Wellbutrin XL) 150 MG 24 hr tablet; Take 1 tablet by mouth Daily.  Dispense: 30 tablet; Refill: 1.  Can increase dose if needed.  Aware this may increase anxiety.         - Encouraged to take part in daily physical exercise.          - Eat healthy, well balanced diet; avoid sugary foods or beverages        - Continue to abstain from alcohol and drugs        - Ensure good night's sleep by creating calm space in bedroom, avoiding screen time 1-2 hours before bed, no caffeine after 5 pm        - Talk to supportive family and friends, as needed        - Consider journaling, other creative way to express feelings, if needed          Return for Annual.  Patient was given instructions and counseling regarding her condition or for health maintenance advice. Please see specific information pulled into the AVS if appropriate.

## 2025-03-04 DIAGNOSIS — F32.0 CURRENT MILD EPISODE OF MAJOR DEPRESSIVE DISORDER WITHOUT PRIOR EPISODE: ICD-10-CM

## 2025-03-04 DIAGNOSIS — R45.86 MOOD SWINGS: ICD-10-CM

## 2025-03-04 RX ORDER — BUPROPION HYDROCHLORIDE 300 MG/1
300 TABLET ORAL DAILY
Qty: 30 TABLET | Refills: 1 | Status: SHIPPED | OUTPATIENT
Start: 2025-03-04

## 2025-03-31 DIAGNOSIS — F32.0 CURRENT MILD EPISODE OF MAJOR DEPRESSIVE DISORDER WITHOUT PRIOR EPISODE: ICD-10-CM

## 2025-03-31 DIAGNOSIS — R45.86 MOOD SWINGS: ICD-10-CM

## 2025-03-31 RX ORDER — BUPROPION HYDROCHLORIDE 300 MG/1
300 TABLET ORAL DAILY
Qty: 90 TABLET | Refills: 1 | Status: SHIPPED | OUTPATIENT
Start: 2025-03-31

## 2025-04-03 ENCOUNTER — HOSPITAL ENCOUNTER (OUTPATIENT)
Dept: MAMMOGRAPHY | Facility: HOSPITAL | Age: 50
Discharge: HOME | End: 2025-04-03
Admitting: NURSE PRACTITIONER
Payer: COMMERCIAL

## 2025-04-03 DIAGNOSIS — Z12.31 ENCOUNTER FOR SCREENING MAMMOGRAM FOR MALIGNANT NEOPLASM OF BREAST: ICD-10-CM

## 2025-04-03 PROCEDURE — 77067 SCR MAMMO BI INCL CAD: CPT

## 2025-04-03 PROCEDURE — 77063 BREAST TOMOSYNTHESIS BI: CPT
